# Patient Record
Sex: FEMALE | Race: WHITE | NOT HISPANIC OR LATINO | Employment: OTHER | URBAN - METROPOLITAN AREA
[De-identification: names, ages, dates, MRNs, and addresses within clinical notes are randomized per-mention and may not be internally consistent; named-entity substitution may affect disease eponyms.]

---

## 2023-03-10 LAB
ALANINE AMINOTRANSFERASE (SGPT) (U/L) IN SER/PLAS: 16 U/L (ref 7–45)
ALBUMIN (G/DL) IN SER/PLAS: 4.4 G/DL (ref 3.4–5)
ALKALINE PHOSPHATASE (U/L) IN SER/PLAS: 64 U/L (ref 33–136)
ANION GAP IN SER/PLAS: 9 MMOL/L (ref 10–20)
ASPARTATE AMINOTRANSFERASE (SGOT) (U/L) IN SER/PLAS: 20 U/L (ref 9–39)
BILIRUBIN TOTAL (MG/DL) IN SER/PLAS: 0.5 MG/DL (ref 0–1.2)
CALCIUM (MG/DL) IN SER/PLAS: 9.6 MG/DL (ref 8.6–10.3)
CARBON DIOXIDE, TOTAL (MMOL/L) IN SER/PLAS: 31 MMOL/L (ref 21–32)
CHLORIDE (MMOL/L) IN SER/PLAS: 103 MMOL/L (ref 98–107)
CHOLESTEROL (MG/DL) IN SER/PLAS: 113 MG/DL (ref 0–199)
CHOLESTEROL IN HDL (MG/DL) IN SER/PLAS: 34.8 MG/DL
CHOLESTEROL/HDL RATIO: 3.2
COBALAMIN (VITAMIN B12) (PG/ML) IN SER/PLAS: 485 PG/ML (ref 211–911)
CREATININE (MG/DL) IN SER/PLAS: 1.04 MG/DL (ref 0.5–1.05)
ESTIMATED AVERAGE GLUCOSE FOR HBA1C: 100 MG/DL
GFR FEMALE: 58 ML/MIN/1.73M2
GLUCOSE (MG/DL) IN SER/PLAS: 90 MG/DL (ref 74–99)
HEMOGLOBIN A1C/HEMOGLOBIN TOTAL IN BLOOD: 5.1 %
LDL: 53 MG/DL (ref 0–99)
POTASSIUM (MMOL/L) IN SER/PLAS: 3.6 MMOL/L (ref 3.5–5.3)
PROTEIN TOTAL: 6.8 G/DL (ref 6.4–8.2)
SODIUM (MMOL/L) IN SER/PLAS: 139 MMOL/L (ref 136–145)
THYROTROPIN (MIU/L) IN SER/PLAS BY DETECTION LIMIT <= 0.05 MIU/L: 2.95 MIU/L (ref 0.44–3.98)
TRIGLYCERIDE (MG/DL) IN SER/PLAS: 128 MG/DL (ref 0–149)
UREA NITROGEN (MG/DL) IN SER/PLAS: 12 MG/DL (ref 6–23)
VLDL: 26 MG/DL (ref 0–40)

## 2023-09-19 LAB
ALBUMIN (G/DL) IN SER/PLAS: 4.5 G/DL (ref 3.4–5)
ANION GAP IN SER/PLAS: 12 MMOL/L (ref 10–20)
APPEARANCE, URINE: ABNORMAL
BASOPHILS (10*3/UL) IN BLOOD BY AUTOMATED COUNT: 0.03 X10E9/L (ref 0–0.1)
BASOPHILS/100 LEUKOCYTES IN BLOOD BY AUTOMATED COUNT: 0.5 % (ref 0–2)
BILIRUBIN, URINE: NEGATIVE
BLOOD, URINE: NEGATIVE
C REACTIVE PROTEIN (MG/L) IN SER/PLAS: 0.19 MG/DL
CALCIUM (MG/DL) IN SER/PLAS: 9.9 MG/DL (ref 8.6–10.3)
CARBON DIOXIDE, TOTAL (MMOL/L) IN SER/PLAS: 29 MMOL/L (ref 21–32)
CHLORIDE (MMOL/L) IN SER/PLAS: 103 MMOL/L (ref 98–107)
COLOR, URINE: ABNORMAL
CREATININE (MG/DL) IN SER/PLAS: 0.86 MG/DL (ref 0.5–1.05)
EOSINOPHILS (10*3/UL) IN BLOOD BY AUTOMATED COUNT: 0.27 X10E9/L (ref 0–0.7)
EOSINOPHILS/100 LEUKOCYTES IN BLOOD BY AUTOMATED COUNT: 4.3 % (ref 0–6)
ERYTHROCYTE DISTRIBUTION WIDTH (RATIO) BY AUTOMATED COUNT: 12.7 % (ref 11.5–14.5)
ERYTHROCYTE MEAN CORPUSCULAR HEMOGLOBIN CONCENTRATION (G/DL) BY AUTOMATED: 34.3 G/DL (ref 32–36)
ERYTHROCYTE MEAN CORPUSCULAR VOLUME (FL) BY AUTOMATED COUNT: 90 FL (ref 80–100)
ERYTHROCYTES (10*6/UL) IN BLOOD BY AUTOMATED COUNT: 4.77 X10E12/L (ref 4–5.2)
GFR FEMALE: 72 ML/MIN/1.73M2
GLUCOSE (MG/DL) IN SER/PLAS: 87 MG/DL (ref 74–99)
GLUCOSE, URINE: NEGATIVE MG/DL
HEMATOCRIT (%) IN BLOOD BY AUTOMATED COUNT: 42.8 % (ref 36–46)
HEMOGLOBIN (G/DL) IN BLOOD: 14.7 G/DL (ref 12–16)
HEPATITIS B VIRUS SURFACE AB (MIU/ML) IN SERUM: 990.3 MIU/ML
IMMATURE GRANULOCYTES/100 LEUKOCYTES IN BLOOD BY AUTOMATED COUNT: 0.3 % (ref 0–0.9)
KETONES, URINE: NEGATIVE MG/DL
LEUKOCYTE ESTERASE, URINE: ABNORMAL
LEUKOCYTES (10*3/UL) IN BLOOD BY AUTOMATED COUNT: 6.3 X10E9/L (ref 4.4–11.3)
LYMPHOCYTES (10*3/UL) IN BLOOD BY AUTOMATED COUNT: 2.1 X10E9/L (ref 1.2–4.8)
LYMPHOCYTES/100 LEUKOCYTES IN BLOOD BY AUTOMATED COUNT: 33.5 % (ref 13–44)
MONOCYTES (10*3/UL) IN BLOOD BY AUTOMATED COUNT: 0.35 X10E9/L (ref 0.1–1)
MONOCYTES/100 LEUKOCYTES IN BLOOD BY AUTOMATED COUNT: 5.6 % (ref 2–10)
NEUTROPHILS (10*3/UL) IN BLOOD BY AUTOMATED COUNT: 3.49 X10E9/L (ref 1.2–7.7)
NEUTROPHILS/100 LEUKOCYTES IN BLOOD BY AUTOMATED COUNT: 55.8 % (ref 40–80)
NITRITE, URINE: NEGATIVE
PH, URINE: 7 (ref 5–8)
PHOSPHATE (MG/DL) IN SER/PLAS: 3.8 MG/DL (ref 2.5–4.9)
PLATELETS (10*3/UL) IN BLOOD AUTOMATED COUNT: 231 X10E9/L (ref 150–450)
POTASSIUM (MMOL/L) IN SER/PLAS: 3.9 MMOL/L (ref 3.5–5.3)
PROTEIN, URINE: NEGATIVE MG/DL
RBC, URINE: 7 /HPF (ref 0–5)
SEDIMENTATION RATE, ERYTHROCYTE: 2 MM/H (ref 0–30)
SODIUM (MMOL/L) IN SER/PLAS: 140 MMOL/L (ref 136–145)
SPECIFIC GRAVITY, URINE: 1.01 (ref 1–1.03)
SQUAMOUS EPITHELIAL CELLS, URINE: <1 /HPF
TRANSITIONAL EPITHELIAL CELLS, URINE: <1 /HPF
UREA NITROGEN (MG/DL) IN SER/PLAS: 17 MG/DL (ref 6–23)
UROBILINOGEN, URINE: <2 MG/DL (ref 0–1.9)
WBC, URINE: 5 /HPF (ref 0–5)

## 2023-10-21 PROBLEM — D22.60 MELANOCYTIC NEVI OF UNSPECIFIED UPPER LIMB, INCLUDING SHOULDER: Status: ACTIVE | Noted: 2023-07-12

## 2023-10-21 PROBLEM — D18.01 HEMANGIOMA OF SKIN AND SUBCUTANEOUS TISSUE: Status: ACTIVE | Noted: 2023-07-12

## 2023-10-21 PROBLEM — R35.1 NOCTURIA: Status: ACTIVE | Noted: 2023-10-21

## 2023-10-21 PROBLEM — L81.4 OTHER MELANIN HYPERPIGMENTATION: Status: ACTIVE | Noted: 2023-07-12

## 2023-10-21 PROBLEM — L71.9 ROSACEA, UNSPECIFIED: Status: ACTIVE | Noted: 2023-07-12

## 2023-10-21 PROBLEM — D22.5 MELANOCYTIC NEVI OF TRUNK: Status: ACTIVE | Noted: 2023-07-12

## 2023-10-21 PROBLEM — L90.5 SCAR CONDITION AND FIBROSIS OF SKIN: Status: ACTIVE | Noted: 2023-07-12

## 2023-10-21 PROBLEM — E78.5 HYPERLIPIDEMIA: Status: ACTIVE | Noted: 2023-10-21

## 2023-10-21 PROBLEM — H35.371 EPIRETINAL MEMBRANE (ERM) OF RIGHT EYE: Status: ACTIVE | Noted: 2023-10-21

## 2023-10-21 PROBLEM — L71.8 OTHER ROSACEA: Status: ACTIVE | Noted: 2023-07-12

## 2023-10-21 PROBLEM — Z96.1 PSEUDOPHAKIA OF RIGHT EYE: Status: ACTIVE | Noted: 2023-10-21

## 2023-10-21 PROBLEM — N28.1 SIMPLE RENAL CYST: Status: ACTIVE | Noted: 2023-10-21

## 2023-10-21 PROBLEM — Z86.39 HISTORY OF OBESITY: Status: ACTIVE | Noted: 2023-10-21

## 2023-10-21 PROBLEM — L73.8 OTHER SPECIFIED FOLLICULAR DISORDERS: Status: ACTIVE | Noted: 2023-07-12

## 2023-10-21 PROBLEM — D48.5 NEOPLASM OF UNCERTAIN BEHAVIOR OF SKIN: Status: ACTIVE | Noted: 2023-07-12

## 2023-10-21 PROBLEM — D22.39 MELANOCYTIC NEVI OF OTHER PARTS OF FACE: Status: ACTIVE | Noted: 2023-07-12

## 2023-10-21 PROBLEM — S49.90XA SHOULDER INJURY, INITIAL ENCOUNTER: Status: ACTIVE | Noted: 2023-10-21

## 2023-10-21 PROBLEM — Z85.828 PERSONAL HISTORY OF OTHER MALIGNANT NEOPLASM OF SKIN: Status: ACTIVE | Noted: 2023-07-12

## 2023-10-21 PROBLEM — H26.491 PCO (POSTERIOR CAPSULAR OPACIFICATION), RIGHT: Status: ACTIVE | Noted: 2023-10-21

## 2023-10-21 PROBLEM — R63.5 WEIGHT GAIN: Status: ACTIVE | Noted: 2023-10-21

## 2023-10-21 PROBLEM — Z98.41 HISTORY OF RIGHT CATARACT EXTRACTION: Status: ACTIVE | Noted: 2023-10-21

## 2023-10-21 PROBLEM — S42.253A: Status: ACTIVE | Noted: 2023-10-21

## 2023-10-21 PROBLEM — J45.909 ASTHMA (HHS-HCC): Status: ACTIVE | Noted: 2023-10-21

## 2023-10-21 PROBLEM — L57.9 SKIN CHANGES DUE TO CHRONIC EXPOSURE TO NONIONIZING RADIATION, UNSPECIFIED: Status: ACTIVE | Noted: 2023-07-12

## 2023-10-21 PROBLEM — M65.332 TRIGGER MIDDLE FINGER OF LEFT HAND: Status: ACTIVE | Noted: 2023-10-21

## 2023-10-21 PROBLEM — H25.812 COMBINED FORMS OF AGE-RELATED CATARACT OF LEFT EYE: Status: ACTIVE | Noted: 2023-10-21

## 2023-10-21 PROBLEM — H52.00 HYPEROPIA: Status: ACTIVE | Noted: 2023-10-21

## 2023-10-21 PROBLEM — L57.0 ACTINIC KERATOSIS: Status: ACTIVE | Noted: 2023-07-12

## 2023-10-21 PROBLEM — D18.03 LIVER HEMANGIOMA: Status: ACTIVE | Noted: 2023-10-21

## 2023-10-21 PROBLEM — D22.70 MELANOCYTIC NEVI OF UNSPECIFIED LOWER LIMB, INCLUDING HIP: Status: ACTIVE | Noted: 2023-07-12

## 2023-10-21 PROBLEM — L71.9 BLEPHARITIS WITH ROSACEA: Status: ACTIVE | Noted: 2023-10-21

## 2023-10-21 PROBLEM — M25.569 KNEE PAIN: Status: ACTIVE | Noted: 2023-10-21

## 2023-10-21 PROBLEM — L82.1 OTHER SEBORRHEIC KERATOSIS: Status: ACTIVE | Noted: 2023-07-12

## 2023-10-21 PROBLEM — H04.129 DRY EYE SYNDROME: Status: ACTIVE | Noted: 2023-10-21

## 2023-10-21 PROBLEM — I10 ESSENTIAL HYPERTENSION: Status: ACTIVE | Noted: 2023-10-21

## 2023-10-21 PROBLEM — E83.52 HYPERCALCEMIA: Status: ACTIVE | Noted: 2023-10-21

## 2023-10-21 PROBLEM — L82.0 INFLAMED SEBORRHEIC KERATOSIS: Status: ACTIVE | Noted: 2023-07-12

## 2023-10-21 PROBLEM — H01.009 BLEPHARITIS WITH ROSACEA: Status: ACTIVE | Noted: 2023-10-21

## 2023-10-21 RX ORDER — PERPHENAZINE/AMITRIPTYLINE HCL 4 MG-25 MG
1 TABLET ORAL DAILY
COMMUNITY

## 2023-10-21 RX ORDER — VITAMIN B COMPLEX
1 CAPSULE ORAL DAILY
COMMUNITY

## 2023-10-21 RX ORDER — VIT C/E/ZN/COPPR/LUTEIN/ZEAXAN 250MG-90MG
1 CAPSULE ORAL DAILY
COMMUNITY
End: 2024-03-11 | Stop reason: WASHOUT

## 2023-10-21 RX ORDER — MELATON/THEAN/VAL/LEM/CHAM/LAV 10MG-200MG
1 TABLET,IMMED, EXTENDED RELEASE, BIPHASIC ORAL DAILY
COMMUNITY
End: 2024-03-21 | Stop reason: SDUPTHER

## 2023-10-21 RX ORDER — BUPROPION HYDROCHLORIDE 150 MG/1
1 TABLET ORAL DAILY
COMMUNITY
Start: 2015-02-05

## 2023-10-21 RX ORDER — MULTIVITAMIN
1 TABLET ORAL DAILY
COMMUNITY
Start: 2016-04-13

## 2023-10-21 RX ORDER — ALBUTEROL SULFATE 90 UG/1
2 AEROSOL, METERED RESPIRATORY (INHALATION) EVERY 4 HOURS PRN
COMMUNITY
Start: 2020-06-22

## 2023-10-21 RX ORDER — HYDROCHLOROTHIAZIDE 25 MG/1
1 TABLET ORAL DAILY
COMMUNITY
Start: 2015-03-05

## 2023-10-21 RX ORDER — DICLOFENAC SODIUM 30 MG/G
GEL TOPICAL
COMMUNITY

## 2023-10-21 RX ORDER — CALCIUM CARBONATE 600 MG
1 TABLET ORAL DAILY
COMMUNITY

## 2023-10-21 RX ORDER — CHOLECALCIFEROL (VITAMIN D3) 50 MCG
1 TABLET ORAL DAILY
COMMUNITY

## 2023-10-21 RX ORDER — ATORVASTATIN CALCIUM 10 MG/1
1 TABLET, FILM COATED ORAL DAILY
COMMUNITY

## 2023-10-24 ENCOUNTER — TELEMEDICINE (OUTPATIENT)
Dept: PRIMARY CARE | Facility: CLINIC | Age: 68
End: 2023-10-24
Payer: MEDICARE

## 2023-10-24 DIAGNOSIS — R82.998 LEUKOCYTES IN URINE: Primary | ICD-10-CM

## 2023-10-24 PROCEDURE — 99213 OFFICE O/P EST LOW 20 MIN: CPT | Performed by: INTERNAL MEDICINE

## 2023-10-24 RX ORDER — ESTRADIOL 0.1 MG/G
CREAM VAGINAL
Qty: 42.5 G | Refills: 5 | Status: SHIPPED | OUTPATIENT
Start: 2023-10-24 | End: 2023-12-06 | Stop reason: SDUPTHER

## 2023-10-24 NOTE — PATIENT INSTRUCTIONS
Patient Discussion/Summary     1. Good to see you  2. For the RBC and WBC in the urine lets start with estradiol cream in the vagina twice a week Monday and Thursday a pea sized amount.    3. Glad that the left sided chest and back fullness and focal tenderness are better.  Still think was more consistent with muscle pain than anything.   4. The rest of your blood work looked great  5. Your pressure is good.  Thanks for checking.    6. Annual exam in March  7. Let me know about the National weight Control Registry next time  8.  Thanks for getting your COVID vaccine one week ago, Flu, RSV and TdaP

## 2023-10-24 NOTE — PROGRESS NOTES
Subjective   Patient ID:   [id number]   [id number]   Codie Adhikari is a 68 y.o. female who presents for No chief complaint on file..  HPI  Chest and back pain has improved.  It is still a twinge every now and then.    Blood pressure review.  Yesterday 129/82 and this /80.    ROS were reviewed and are negative with the exception of what is noted in HPI    There were no vitals taken for this visit.  Objective   Physical Exam    Speech fluent, no dyspnea    Assessment/Plan   Problem List Items Addressed This Visit    None          Provider Impressions     #Back pain - Unclear exactly how to explain the left sided chest and back fullness and focal tenderness but has improved.  Still suspect more consistent with muscle pain than anything.   #. Hypertension - pressure better.    #. Renal Cyst - left cyst unchanged from 2005 CT, 2018 CT, 2018 MRI with unchanged 5.7 cm exophytic simple cyst. multiple ultrasounds, last one 2011 was felt to be stable and that no further imaging was indicated.  #. Microscopic hematuria, leuocyturia - she had negative evaluation for hematuria remote past.  Remote history of proteinuria last saw nephrology 2006.  Would like her to try a bit of estradiol cream vaginally and see if that help with the white cells.  Also to check culture.   #. Weight - She has lost 34 pounds over past 18 months and she is really happy with her progress and lifestyle changes. Impressed and reinforced her good habits. Uses the [employer] nando.   #. GERD - past response to omeprazole and now basically resolved with weight loss unless she eats dairy. CT negative except hemangioma liver, stable 5.7 cm renal cyst left. To get EGD with next colonoscopy and ordered.   #. Asthma- stable and doing well, immunizations uptodate  #. Lipids - on atorvastatin, future consider increase to 20 mg.   #. Basal Cell CA- she watches, derm followup   #. Anxiety, depression - bupropion has been helpful and she would like to continue for  "now. Tried to get off and felt was angry all of the time so wants to continue.   #. Neuropathy vs. Raynauds-- less issue on low carbs  #. Fatty liver, hemangioma - MRI 2018 clarification. She has lost 20 pounds and suspect this will improve. Ultrasound ordered to review.   #. Epicondylitis - Mj saw and felt \"golfers elbow\"  #. HM-   full physical exam [facility] 2023  bone densitometry normal 1/11  mammo 03/23  colonoscopy-2014 with adenomatous polyp, repeat 2019 with 10 mm sessile serrated adenoma repeat 2023 with tubular adenoma and EGD negative. Repeat in 2028.    PAP 2014 with HPV negative. done at this point.   Immunizations: current with flu shot, COVID, RSV, HZV and TdaP, prevnar, P23 and shingrix     Glenny Liu MD  "

## 2023-12-06 DIAGNOSIS — R82.998 LEUKOCYTES IN URINE: ICD-10-CM

## 2023-12-06 RX ORDER — ESTRADIOL 0.1 MG/G
CREAM VAGINAL
Qty: 42.5 G | Refills: 5 | Status: SHIPPED | OUTPATIENT
Start: 2023-12-06 | End: 2024-12-05

## 2024-01-17 ENCOUNTER — OFFICE VISIT (OUTPATIENT)
Dept: DERMATOLOGY | Facility: CLINIC | Age: 69
End: 2024-01-17
Payer: MEDICARE

## 2024-01-17 DIAGNOSIS — L82.1 SEBORRHEIC KERATOSIS: ICD-10-CM

## 2024-01-17 DIAGNOSIS — D18.01 HEMANGIOMA OF SKIN: ICD-10-CM

## 2024-01-17 DIAGNOSIS — D22.9 MULTIPLE BENIGN NEVI: ICD-10-CM

## 2024-01-17 DIAGNOSIS — L81.4 LENTIGO: ICD-10-CM

## 2024-01-17 DIAGNOSIS — Z85.828 PERSONAL HISTORY OF SKIN CANCER: Primary | ICD-10-CM

## 2024-01-17 PROCEDURE — 1126F AMNT PAIN NOTED NONE PRSNT: CPT | Performed by: STUDENT IN AN ORGANIZED HEALTH CARE EDUCATION/TRAINING PROGRAM

## 2024-01-17 PROCEDURE — 99213 OFFICE O/P EST LOW 20 MIN: CPT | Performed by: STUDENT IN AN ORGANIZED HEALTH CARE EDUCATION/TRAINING PROGRAM

## 2024-01-17 PROCEDURE — 1159F MED LIST DOCD IN RCRD: CPT | Performed by: STUDENT IN AN ORGANIZED HEALTH CARE EDUCATION/TRAINING PROGRAM

## 2024-01-17 NOTE — PROGRESS NOTES
"Subjective   Codie Adhikari is a 68 y.o. female who presents for the following: Skin Check (LV: 7/12/23: FBSE. No concerning lesions today.).    Skin Cancer History  Unknown type of skin cancer - Right proximal FA, lesion \"burned off\" several years ago  BCC - left cheek s/p Mohs 2011  AK's    Family History of Skin Cancer  Father-NMSC    The following portions of the chart were reviewed this encounter and updated as appropriate:         Review of Systems: No other skin or systemic complaints.    Objective   Well appearing patient in no apparent distress; mood and affect are within normal limits.    A full examination was performed including scalp, head, eyes, ears, nose, lips, neck, chest, axillae, abdomen, back, buttocks, bilateral upper extremities, bilateral lower extremities, hands, feet, fingers, toes, fingernails, and toenails. All findings within normal limits unless otherwise noted below.    Well healed scar(s) at site(s) of prior treatment    Scattered cherry-red papule(s).    Scattered tan macules in sun-exposed areas.    Stuck on verrucous, tan-brown papules and plaques.      Scattered, uniform and benign-appearing, regular brown melanocytic papules and macules.      Assessment/Plan   Personal history of skin cancer    No evidence of recurrence at site(s) of prior treatment  Continue photoprotection with sun-protective clothing and sunscreen SPF 30+ daily  Continue routine self-skin examination  Continue to follow up every 6-12 months for routine FBSE or earlier prn for new/changing/concerning lesions       Hemangioma of skin    Reassured of benign nature of lesions    Lentigo    Reassured of benign nature of lesions    Seborrheic keratosis    Reassured of benign nature of lesions    Multiple benign nevi    Reassured of benign nature of lesions          Scribe Attestation  By signing my name below, IAby LPN , Alicia   attest that this documentation has been prepared under the direction and " in the presence of Christiano Alvarez MD.

## 2024-03-04 ENCOUNTER — LAB (OUTPATIENT)
Dept: LAB | Facility: LAB | Age: 69
End: 2024-03-04
Payer: MEDICARE

## 2024-03-04 DIAGNOSIS — R82.998 LEUKOCYTES IN URINE: ICD-10-CM

## 2024-03-04 LAB
APPEARANCE UR: CLEAR
BACTERIA #/AREA URNS AUTO: ABNORMAL /HPF
BILIRUB UR STRIP.AUTO-MCNC: NEGATIVE MG/DL
COLOR UR: YELLOW
GLUCOSE UR STRIP.AUTO-MCNC: NEGATIVE MG/DL
KETONES UR STRIP.AUTO-MCNC: NEGATIVE MG/DL
LEUKOCYTE ESTERASE UR QL STRIP.AUTO: ABNORMAL
MUCOUS THREADS #/AREA URNS AUTO: ABNORMAL /LPF
NITRITE UR QL STRIP.AUTO: NEGATIVE
PH UR STRIP.AUTO: 6 [PH]
PROT UR STRIP.AUTO-MCNC: NEGATIVE MG/DL
RBC # UR STRIP.AUTO: ABNORMAL /UL
RBC #/AREA URNS AUTO: ABNORMAL /HPF
SP GR UR STRIP.AUTO: 1.02
SQUAMOUS #/AREA URNS AUTO: ABNORMAL /HPF
UROBILINOGEN UR STRIP.AUTO-MCNC: 2 MG/DL
WBC #/AREA URNS AUTO: ABNORMAL /HPF

## 2024-03-04 PROCEDURE — 87086 URINE CULTURE/COLONY COUNT: CPT

## 2024-03-04 PROCEDURE — 81001 URINALYSIS AUTO W/SCOPE: CPT

## 2024-03-05 LAB — BACTERIA UR CULT: NORMAL

## 2024-03-06 DIAGNOSIS — R82.998 URINE LEUKOCYTES: ICD-10-CM

## 2024-03-06 DIAGNOSIS — R39.9 LOWER URINARY TRACT SYMPTOMS: Primary | ICD-10-CM

## 2024-03-06 DIAGNOSIS — R31.9 HEMATURIA, UNSPECIFIED TYPE: ICD-10-CM

## 2024-03-06 RX ORDER — NITROFURANTOIN 25; 75 MG/1; MG/1
100 CAPSULE ORAL 2 TIMES DAILY
Qty: 14 CAPSULE | Refills: 0 | Status: SHIPPED | OUTPATIENT
Start: 2024-03-06 | End: 2024-03-21 | Stop reason: ALTCHOICE

## 2024-03-06 NOTE — PROGRESS NOTES
68 year old female with urinary symptoms, WBC and RBC in urine but no growth.  Will order ultrasound and refer to urology for their assistance with urodynamics and other recommendations.  Round of nitrofurantoin as well.

## 2024-03-08 ENCOUNTER — HOSPITAL ENCOUNTER (OUTPATIENT)
Dept: RADIOLOGY | Facility: CLINIC | Age: 69
Discharge: HOME | End: 2024-03-08
Payer: MEDICARE

## 2024-03-08 DIAGNOSIS — R82.998 URINE LEUKOCYTES: ICD-10-CM

## 2024-03-08 DIAGNOSIS — R39.9 LOWER URINARY TRACT SYMPTOMS: ICD-10-CM

## 2024-03-08 DIAGNOSIS — R31.9 HEMATURIA, UNSPECIFIED TYPE: ICD-10-CM

## 2024-03-08 PROCEDURE — 76770 US EXAM ABDO BACK WALL COMP: CPT

## 2024-03-08 PROCEDURE — 76770 US EXAM ABDO BACK WALL COMP: CPT | Performed by: STUDENT IN AN ORGANIZED HEALTH CARE EDUCATION/TRAINING PROGRAM

## 2024-03-10 NOTE — PROGRESS NOTES
[facility]  Outpatient Clinic Note    Patient: Codie Adhikari  Age/Sex: 69 y.o., female  MRN: [id number]  Referred by: Dr. Glenny Liu   Virtual Visit: An interactive audio and video telecommunication system which permits real time communications between the patient (at the originating site) and provider (at the distant site) was utilized to provide this telehealth service. Verbal consent was requested and obtained from Codie Adhikari on this date 2024 for a telehealth visit.     Chief Complaint:  microhematuria          History of Present Illness  This is a 69 y.o. female,  who presents to the office as a new patient for micro hematuria, urinary urgency and frequency. The patient stated the microhematuria has bene going on since . The urinary urgency and frequency is a newer onset of a few months ago. She stated her PCP recently gave her Macrobid causing a slight relief to her urgency and frequency. Microscopic hematuria noted on microscopic urine lab RBC 11-20, and urine culture negative on 3/4/2024. Renal ultrasound completed on 3/8/2024 showed No renal calculi or hydronephrosis. Left upper pole simple cysts, the larger 6.9 cm. 1.3% postvoid urinary bladder residual. The patient has started on vaginal estrogen. She admits to ADAM that is not bothersome yet.The patient denies dysuria, gross hematuria, flank pain, abdominal pain, nausea, vomiting, fever or chills. Denies vaginal bulging, UUI, or constipation.  She reports her bowel movements are normal and daily, she drinks fiber in her coffee. She is a vegetarian. The patient is sexually active and denies pain with intercourse, she does complain of vaginal dryness.  Microscopic hematuria, she had negative evaluation for hematuria remote past. Remote history of proteinuria last saw nephrology . PCP prescribed estradiol cream vaginally and to see if that would help with the white cells. Renal Cyst - left cyst unchanged from  CT,  2018 CT, 2018 MRI with unchanged 5.7 cm exophytic simple cyst. multiple ultrasounds, last one 2011 was felt to be stable and that no further imaging was indicated.   Patient had two vaginal births. She denies any other pelvic surgeries or PMB. She denies a breast cancer history. Denies ever using tobacco.         Gyn History:  - Menopausal: No           Postmenopausal bleeding: No  - Hysterectomy: No  - Pap up to date: Yes - 2014 HPV negative   History of abnormal pap: No  - Sexually active:  Yes  Dyspareunia: No   Other issues: vaginal dryness  - Number of prior vaginal deliveries: 2  - Number of prior c-sections: 0    - Mammogram up to date: Yes - 03/2023  - Colonoscopy up to date: Yes - colonoscopy-2014 with adenomatous polyp, repeat 2019 with 10 mm sessile serrated adenoma repeat 2023 with tubular adenoma and EGD negative. 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    Breast anomaly    Dyspnea, unspecified 02/05/2015    Dyspnea    Elevated blood-pressure reading, without diagnosis of hypertension 05/03/2017    Borderline hypertension    Encounter for immunization 08/12/2021    Encounter for immunization    Encounter for screening for malignant neoplasm of colon 10/03/2019    Screen for colon cancer    Gastro-esophageal reflux disease without esophagitis 08/12/2021    GERD (gastroesophageal reflux disease)    Long term (current) use of antibiotics 07/27/2016    Prophylactic antibiotic    Low back pain, unspecified 05/03/2017    Low back pain    Myositis, unspecified 05/03/2017    Myofasciitis    Nondisplaced fracture of greater tuberosity of left humerus, initial encounter for closed fracture 02/01/2021    Closed nondisplaced fracture of greater  tuberosity of left humerus, initial encounter    Other conditions influencing health status 02/20/2019    Intentional weight loss    Other general symptoms and signs 05/03/2017    Heat intolerance    Other specified anxiety disorders 04/13/2016    Depression with anxiety    Pain in right shoulder 08/29/2019    Shoulder pain, right    Pain in unspecified joint 05/03/2017    Joint pain    Palpitations 05/03/2017    Palpitations    Personal history of other diseases of the musculoskeletal system and connective tissue 07/18/2018    History of back pain    Personal history of other medical treatment 07/18/2018    History of screening mammography    Snoring 05/03/2017    Snoring    Strain of unspecified muscle, fascia and tendon at shoulder and upper arm level, left arm, initial encounter 08/29/2019    Strain of elbow, left    Unspecified cataract 04/27/2016    Cataract of right eye    Unspecified cataract 04/27/2016    Cataract of left eye    Unspecified cataract 04/27/2016    Cataract of right eye    Unspecified cataract 04/27/2016    Cataract of left eye    Vitamin D deficiency, unspecified 08/03/2017    Vitamin D deficiency     Past Surgical History:   Procedure Laterality Date    OTHER SURGICAL HISTORY  08/17/2022    Cataract surgery    TONSILLECTOMY  04/27/2016    Tonsillectomy    TUBAL LIGATION  04/27/2016    Tubal Ligation       Family History  Family History   Problem Relation Name Age of Onset    Hypertension Mother      Other (Diabetes mellitus) Father      Glaucoma Father      Hypertension Father         Social History  She has no history on file for tobacco use, alcohol use, and drug use.    Allergies  Latex and Tetracyclines    Medications:  Current Outpatient Medications on File Prior to Visit   Medication Sig Dispense Refill    albuterol 90 mcg/actuation inhaler Inhale 2 puffs every 4 hours if needed.      atorvastatin (Lipitor) 10 mg tablet Take 1 tablet (10 mg) by mouth once daily.      b complex  vitamins capsule Take 1 capsule by mouth once daily.      buPROPion XL (Wellbutrin XL) 150 mg 24 hr tablet Take 1 tablet (150 mg) by mouth once daily.      calcium carbonate 600 mg calcium (1,500 mg) tablet Take 1 tablet (600 mg) by mouth once daily.      cholecalciferol (Vitamin D-3) 25 MCG (1000 UT) capsule Take 1 capsule (25 mcg) by mouth once daily.      cholecalciferol (Vitamin D-3) 50 MCG (2000 UT) tablet Take 1 tablet (2,000 Units) by mouth once daily.      diclofenac sodium 3 % gel Apply sparingly to the affected areas twice daily      estradiol (Estrace) 0.01 % (0.1 mg/gram) vaginal cream Apply pea sized amount to vagina nightly for 1 week then every Monday and Thursday 42.5 g 5    folic acid/vit B complex and C (B complex-vitamin C-folic acid) 400 mcg tablet extended release Take 1 tablet by mouth once daily.      glucosam/chond-msm1/C/nehemiah/bor (GLUCOSAMINE-CHOND-MSM COMPLEX ORAL) Take 1 tablet by mouth once daily. As directed.      hydroCHLOROthiazide (HYDRODiuril) 25 mg tablet Take 1 tablet (25 mg) by mouth once daily.      lutein 40 mg capsule Take 1 capsule by mouth once daily.      multivitamin (Daily Multi-Vitamin) tablet Take 1 tablet by mouth once daily.      nitrofurantoin, macrocrystal-monohydrate, (Macrobid) 100 mg capsule Take 1 capsule (100 mg) by mouth 2 times a day for 7 days. 14 capsule 0    NON FORMULARY Bupivacaine HCI SOSY      PROTEIN SUPPLEMENT ORAL 80% POWD; Use as directed.       No current facility-administered medications on file prior to visit.        Review of Systems   A comprehensive 10+ review of systems was negative except for: see hpi          Physical Exam                                                                                                                      General: Well developed, well nourished, alert and cooperative, appears in no acute distress  Eyes: no proptosis  Lungs: Breathing is easy, non-labored while speaking in clear and complete sentences.    Neuro: alert and oriented to person, place and time  Psych: Demonstrates good judgement and reason, without hallucinations, abnormal affect or abnormal behaviors.  Skin: no obvious lesions, no rashes    Labs  Microscopic Only, Urine  Order: [id number] - Reflex for Order [id number]  Status: Final result       Visible to patient: Yes (seen)       Dx: Leukocytes in urine    0 Result Notes        Component  Ref Range & Units 6 d ago 5 mo ago 4 yr ago   WBC, Urine  1-5, NONE /HPF 1-5 5 R 1 R   RBC, Urine  NONE, 1-2, 3-5 /HPF 11-20 Abnormal  7 Abnormal  R 1 R   Squamous Epithelial Cells, Urine  Reference range not established. /HPF 1-9 (SPARSE) <1 R    Bacteria, Urine  NONE SEEN /HPF 1+ Abnormal      Mucus, Urine  Reference range not established. /LPF 1+     Resulting Agency [facility]              Specimen Collected: 03/04/24 10:05 Last Resulted: 03/04/24 17:48           Urine Culture  Order: [id number]  Collected 3/4/2024 10:05       Status: Final result       Visible to patient: Yes (seen)       Dx: Leukocytes in urine    Specimen Information: Clean Catch/Voided; Urine   0 Result Notes  Urine Culture No significant growth           Resulting Agency: [facility]           Specimen Collected: 03/04/24 10:05 Last Resulted: 03/05/24 13:39             Imaging  US renal Complete 03/08/2024  FINDINGS:  RIGHT KIDNEY:  The right kidney measures 11.1 cm in length. The renal cortical  echogenicity and thickness are within normal limits. No  hydronephrosis. No sonographic evidence of nephrolithiasis.      LEFT KIDNEY:  The left kidney measures 12.4 cm in length. The renal cortical  echogenicity and thickness are within normal limits. 24 X 19 X 18 MM  and 6.9 X 6.5 X 6.4 CM upper pole simple cysts. Mild pelviectasis. No  hydronephrosis. No sonographic evidence of nephrolithiasis.      BLADDER:  The urinary bladder is unremarkable in appearance. Bilateral ureteral  jets visualized. Prevoid volume 307 cc, postvoid  volume 5 cc.  Postvoid residual 1.3%.      IMPRESSION:  No renal calculi or hydronephrosis.  Left upper pole simple cysts, the larger 6.9 cm.  1.3% postvoid urinary bladder residual.      Signed by: John Zapien 3/9/2024 8:32 AM  Dictation workstation:   [id number]      IMPRESSION AND PLAN:  Codie Adhikari is a 69 y.o.  who presents to the office as a new patient for micro hematuria, urinary urgency and frequency. The patient stated the microhematuria has bene going on since . The urinary urgency and frequency is a newer onset of a few months ago. She stated her PCP recently gave her Macrobid causing a slight relief to her urgency and frequency. Microscopic hematuria noted on microscopic urine lab RBC 11-20, and urine culture negative on 3/4/2024. Renal ultrasound completed on 3/8/2024 showed No renal calculi or hydronephrosis. Left upper pole simple cysts, the larger 6.9 cm. 1.3% postvoid urinary bladder residual.     Microscopic Hematuria  -According to the [employer], microscopic hematuria is defined by the presence of three or more red blood cells per high-powered field on microscopic examination of one properly collected, non-contaminated urinalysis with no evidence of infection.  -Microscopic hematuria noted on microscopic urine lab RBC 11-20, and urine culture negative on 3/4/2024.  -We will plan for a diagnostic hematuria workup including: CT Urogram and cystoscopy. Patient is aware of the risks associated with IV contrast. There is no history of renal dysfunction. Risks of cystoscopy were discussed with the patient in great detail, including risk of hematuria, UTI and discomfort.    GMS  -Continue vaginal estrogen twice weekly    Renal Cyst  -Renal ultrasound completed on 3/8/2024 showed No renal calculi or hydronephrosis. Left upper pole simple cysts, the larger 6.9 cm. 1.3% postvoid urinary bladder residual.     Cystoscopy scheduled for  with Dr. Jack Canales  questions and concerns were answered and addressed.  The patient expressed understanding and agrees with the plan.     /Reviewed and approved by ANDRESSA HURLEY on 3/11/24 at 7:16 AM.

## 2024-03-11 ENCOUNTER — TELEMEDICINE (OUTPATIENT)
Dept: UROLOGY | Facility: CLINIC | Age: 69
End: 2024-03-11
Payer: MEDICARE

## 2024-03-11 ENCOUNTER — LAB (OUTPATIENT)
Dept: LAB | Facility: LAB | Age: 69
End: 2024-03-11
Payer: MEDICARE

## 2024-03-11 DIAGNOSIS — R39.15 URINARY URGENCY: ICD-10-CM

## 2024-03-11 DIAGNOSIS — R31.29 MICROHEMATURIA: ICD-10-CM

## 2024-03-11 DIAGNOSIS — R31.29 MICROHEMATURIA: Primary | ICD-10-CM

## 2024-03-11 DIAGNOSIS — N95.8 GENITOURINARY SYNDROME OF MENOPAUSE: ICD-10-CM

## 2024-03-11 DIAGNOSIS — R35.0 URINARY FREQUENCY: ICD-10-CM

## 2024-03-11 DIAGNOSIS — N28.1 RENAL CYST: ICD-10-CM

## 2024-03-11 LAB
CREAT SERPL-MCNC: 0.84 MG/DL (ref 0.5–1.05)
EGFRCR SERPLBLD CKD-EPI 2021: 74 ML/MIN/1.73M*2

## 2024-03-11 PROCEDURE — 36415 COLL VENOUS BLD VENIPUNCTURE: CPT

## 2024-03-11 PROCEDURE — 99204 OFFICE O/P NEW MOD 45 MIN: CPT

## 2024-03-11 PROCEDURE — 87086 URINE CULTURE/COLONY COUNT: CPT

## 2024-03-11 PROCEDURE — 1159F MED LIST DOCD IN RCRD: CPT

## 2024-03-11 PROCEDURE — 1036F TOBACCO NON-USER: CPT

## 2024-03-11 PROCEDURE — 1126F AMNT PAIN NOTED NONE PRSNT: CPT

## 2024-03-11 PROCEDURE — 82565 ASSAY OF CREATININE: CPT

## 2024-03-11 NOTE — LETTER
2024     Glenny Liu MD  [address]  Department Of Medicine-General Internal  [facility] [address]    Patient: Codie Adhikari   YOB: 1955   Date of Visit: 3/11/2024       Dear Dr. Glenny Liu MD:    Thank you for referring Codie Adhikari to me for evaluation. Below are my notes for this consultation.  If you have questions, please do not hesitate to call me. I look forward to following your patient along with you.       Sincerely,     Mariely Sheikh PA-C      CC: No Recipients  ______________________________________________________________________________________      [facility]  Outpatient Clinic Note    Patient: Codie Adhikari  Age/Sex: 69 y.o., female  MRN: [id number]  Referred by: Dr. Glenny Liu   Virtual Visit: An interactive audio and video telecommunication system which permits real time communications between the patient (at the originating site) and provider (at the distant site) was utilized to provide this telehealth service. Verbal consent was requested and obtained from Codie Adhikari on this date 2024 for a telehealth visit.     Chief Complaint:  microhematuria          History of Present Illness  This is a 69 y.o. female,  who presents to the office as a new patient for micro hematuria, urinary urgency and frequency. The patient stated the microhematuria has bene going on since . The urinary urgency and frequency is a newer onset of a few months ago. She stated her PCP recently gave her Macrobid causing a slight relief to her urgency and frequency. Microscopic hematuria noted on microscopic urine lab RBC 11-20, and urine culture negative on 3/4/2024. Renal ultrasound completed on 3/8/2024 showed No renal calculi or hydronephrosis. Left upper pole simple cysts, the larger 6.9 cm. 1.3% postvoid urinary bladder residual. The patient has started on vaginal estrogen. She admits to ADAM that is not bothersome yet.The patient denies  dysuria, gross hematuria, flank pain, abdominal pain, nausea, vomiting, fever or chills. Denies vaginal bulging, UUI, or constipation.  She reports her bowel movements are normal and daily, she drinks fiber in her coffee. She is a vegetarian. The patient is sexually active and denies pain with intercourse, she does complain of vaginal dryness.  Microscopic hematuria, she had negative evaluation for hematuria remote past. Remote history of proteinuria last saw nephrology 2006. PCP prescribed estradiol cream vaginally and to see if that would help with the white cells. Renal Cyst - left cyst unchanged from 2005 CT, 2018 CT, 2018 MRI with unchanged 5.7 cm exophytic simple cyst. multiple ultrasounds, last one 2011 was felt to be stable and that no further imaging was indicated.   Patient had two vaginal births. She denies any other pelvic surgeries or PMB. She denies a breast cancer history. Denies ever using tobacco.         Gyn History:  - Menopausal: No           Postmenopausal bleeding: No  - Hysterectomy: No  - Pap up to date: Yes - 2014 HPV negative   History of abnormal pap: No  - Sexually active:  Yes  Dyspareunia: No   Other issues: vaginal dryness  - Number of prior vaginal deliveries: 2  - Number of prior c-sections: 0    - Mammogram up to date: Yes - 03/2023  - Colonoscopy up to date: Yes - colonoscopy-2014 with adenomatous polyp, repeat 2019 with 10 mm sessile serrated adenoma repeat 2023 with tubular adenoma and EGD negative. Repeat in 2028       Past Medical & Surgical History  Past Medical History:   Diagnosis Date   • Abnormal findings on diagnostic imaging of liver and biliary tract 07/02/2018    Abnormal CT scan, liver   • Abnormal weight loss 05/14/2015    Weight loss   • Congenital malformation of breast, unspecified 05/03/2017    Breast anomaly   • Dyspnea, unspecified 02/05/2015    Dyspnea   • Elevated blood-pressure reading, without diagnosis of hypertension 05/03/2017    Borderline hypertension    • Encounter for immunization 08/12/2021    Encounter for immunization   • Encounter for screening for malignant neoplasm of colon 10/03/2019    Screen for colon cancer   • Gastro-esophageal reflux disease without esophagitis 08/12/2021    GERD (gastroesophageal reflux disease)   • Long term (current) use of antibiotics 07/27/2016    Prophylactic antibiotic   • Low back pain, unspecified 05/03/2017    Low back pain   • Myositis, unspecified 05/03/2017    Myofasciitis   • Nondisplaced fracture of greater tuberosity of left humerus, initial encounter for closed fracture 02/01/2021    Closed nondisplaced fracture of greater tuberosity of left humerus, initial encounter   • Other conditions influencing health status 02/20/2019    Intentional weight loss   • Other general symptoms and signs 05/03/2017    Heat intolerance   • Other specified anxiety disorders 04/13/2016    Depression with anxiety   • Pain in right shoulder 08/29/2019    Shoulder pain, right   • Pain in unspecified joint 05/03/2017    Joint pain   • Palpitations 05/03/2017    Palpitations   • Personal history of other diseases of the musculoskeletal system and connective tissue 07/18/2018    History of back pain   • Personal history of other medical treatment 07/18/2018    History of screening mammography   • Snoring 05/03/2017    Snoring   • Strain of unspecified muscle, fascia and tendon at shoulder and upper arm level, left arm, initial encounter 08/29/2019    Strain of elbow, left   • Unspecified cataract 04/27/2016    Cataract of right eye   • Unspecified cataract 04/27/2016    Cataract of left eye   • Unspecified cataract 04/27/2016    Cataract of right eye   • Unspecified cataract 04/27/2016    Cataract of left eye   • Vitamin D deficiency, unspecified 08/03/2017    Vitamin D deficiency     Past Surgical History:   Procedure Laterality Date   • OTHER SURGICAL HISTORY  08/17/2022    Cataract surgery   • TONSILLECTOMY  04/27/2016    Tonsillectomy   •  TUBAL LIGATION  04/27/2016    Tubal Ligation       Family History  Family History   Problem Relation Name Age of Onset   • Hypertension Mother     • Other (Diabetes mellitus) Father     • Glaucoma Father     • Hypertension Father         Social History  She has no history on file for tobacco use, alcohol use, and drug use.    Allergies  Latex and Tetracyclines    Medications:  Current Outpatient Medications on File Prior to Visit   Medication Sig Dispense Refill   • albuterol 90 mcg/actuation inhaler Inhale 2 puffs every 4 hours if needed.     • atorvastatin (Lipitor) 10 mg tablet Take 1 tablet (10 mg) by mouth once daily.     • b complex vitamins capsule Take 1 capsule by mouth once daily.     • buPROPion XL (Wellbutrin XL) 150 mg 24 hr tablet Take 1 tablet (150 mg) by mouth once daily.     • calcium carbonate 600 mg calcium (1,500 mg) tablet Take 1 tablet (600 mg) by mouth once daily.     • cholecalciferol (Vitamin D-3) 25 MCG (1000 UT) capsule Take 1 capsule (25 mcg) by mouth once daily.     • cholecalciferol (Vitamin D-3) 50 MCG (2000 UT) tablet Take 1 tablet (2,000 Units) by mouth once daily.     • diclofenac sodium 3 % gel Apply sparingly to the affected areas twice daily     • estradiol (Estrace) 0.01 % (0.1 mg/gram) vaginal cream Apply pea sized amount to vagina nightly for 1 week then every Monday and Thursday 42.5 g 5   • folic acid/vit B complex and C (B complex-vitamin C-folic acid) 400 mcg tablet extended release Take 1 tablet by mouth once daily.     • glucosam/chond-msm1/C/nehemiah/bor (GLUCOSAMINE-CHOND-MSM COMPLEX ORAL) Take 1 tablet by mouth once daily. As directed.     • hydroCHLOROthiazide (HYDRODiuril) 25 mg tablet Take 1 tablet (25 mg) by mouth once daily.     • lutein 40 mg capsule Take 1 capsule by mouth once daily.     • multivitamin (Daily Multi-Vitamin) tablet Take 1 tablet by mouth once daily.     • nitrofurantoin, macrocrystal-monohydrate, (Macrobid) 100 mg capsule Take 1 capsule (100 mg) by  mouth 2 times a day for 7 days. 14 capsule 0   • NON FORMULARY Bupivacaine HCI SOSY     • PROTEIN SUPPLEMENT ORAL 80% POWD; Use as directed.       No current facility-administered medications on file prior to visit.        Review of Systems   A comprehensive 10+ review of systems was negative except for: see hpi          Physical Exam                                                                                                                      General: Well developed, well nourished, alert and cooperative, appears in no acute distress  Eyes: no proptosis  Lungs: Breathing is easy, non-labored while speaking in clear and complete sentences.   Neuro: alert and oriented to person, place and time  Psych: Demonstrates good judgement and reason, without hallucinations, abnormal affect or abnormal behaviors.  Skin: no obvious lesions, no rashes    Labs  Microscopic Only, Urine  Order: [id number] - Reflex for Order [id number]  Status: Final result       Visible to patient: Yes (seen)       Dx: Leukocytes in urine    0 Result Notes        Component  Ref Range & Units 6 d ago 5 mo ago 4 yr ago   WBC, Urine  1-5, NONE /HPF 1-5 5 R 1 R   RBC, Urine  NONE, 1-2, 3-5 /HPF 11-20 Abnormal  7 Abnormal  R 1 R   Squamous Epithelial Cells, Urine  Reference range not established. /HPF 1-9 (SPARSE) <1 R    Bacteria, Urine  NONE SEEN /HPF 1+ Abnormal      Mucus, Urine  Reference range not established. /LPF 1+     Resulting Agency [facility]              Specimen Collected: 03/04/24 10:05 Last Resulted: 03/04/24 17:48           Urine Culture  Order: [id number]  Collected 3/4/2024 10:05       Status: Final result       Visible to patient: Yes (seen)       Dx: Leukocytes in urine    Specimen Information: Clean Catch/Voided; Urine   0 Result Notes  Urine Culture No significant growth           Resulting Agency: [facility]           Specimen Collected: 03/04/24 10:05 Last Resulted: 03/05/24 13:39             Imaging  US renal  Complete 2024  FINDINGS:  RIGHT KIDNEY:  The right kidney measures 11.1 cm in length. The renal cortical  echogenicity and thickness are within normal limits. No  hydronephrosis. No sonographic evidence of nephrolithiasis.      LEFT KIDNEY:  The left kidney measures 12.4 cm in length. The renal cortical  echogenicity and thickness are within normal limits. 24 X 19 X 18 MM  and 6.9 X 6.5 X 6.4 CM upper pole simple cysts. Mild pelviectasis. No  hydronephrosis. No sonographic evidence of nephrolithiasis.      BLADDER:  The urinary bladder is unremarkable in appearance. Bilateral ureteral  jets visualized. Prevoid volume 307 cc, postvoid volume 5 cc.  Postvoid residual 1.3%.      IMPRESSION:  No renal calculi or hydronephrosis.  Left upper pole simple cysts, the larger 6.9 cm.  1.3% postvoid urinary bladder residual.      Signed by: John Zapien 3/9/2024 8:32 AM  Dictation workstation:   [id number]      IMPRESSION AND PLAN:  Codie Ahdikari is a 69 y.o.  who presents to the office as a new patient for micro hematuria, urinary urgency and frequency. The patient stated the microhematuria has bene going on since . The urinary urgency and frequency is a newer onset of a few months ago. She stated her PCP recently gave her Macrobid causing a slight relief to her urgency and frequency. Microscopic hematuria noted on microscopic urine lab RBC 11-20, and urine culture negative on 3/4/2024. Renal ultrasound completed on 3/8/2024 showed No renal calculi or hydronephrosis. Left upper pole simple cysts, the larger 6.9 cm. 1.3% postvoid urinary bladder residual.     Microscopic Hematuria  -According to the [employer], microscopic hematuria is defined by the presence of three or more red blood cells per high-powered field on microscopic examination of one properly collected, non-contaminated urinalysis with no evidence of infection.  -Microscopic hematuria noted on microscopic urine lab RBC  11-20, and urine culture negative on 3/4/2024.  -We will plan for a diagnostic hematuria workup including: CT Urogram and cystoscopy. Patient is aware of the risks associated with IV contrast. There is no history of renal dysfunction. Risks of cystoscopy were discussed with the patient in great detail, including risk of hematuria, UTI and discomfort.    GMS  -Continue vaginal estrogen twice weekly    Renal Cyst  -Renal ultrasound completed on 3/8/2024 showed No renal calculi or hydronephrosis. Left upper pole simple cysts, the larger 6.9 cm. 1.3% postvoid urinary bladder residual.     Cystoscopy scheduled for 4/26 with Dr. Santiago    All questions and concerns were answered and addressed.  The patient expressed understanding and agrees with the plan.     /Reviewed and approved by ANDRESSA HURLEY on 3/11/24 at 7:16 AM.

## 2024-03-12 LAB — BACTERIA UR CULT: NO GROWTH

## 2024-03-15 ENCOUNTER — HOSPITAL ENCOUNTER (OUTPATIENT)
Dept: RADIOLOGY | Facility: CLINIC | Age: 69
Discharge: HOME | End: 2024-03-15
Payer: MEDICARE

## 2024-03-15 DIAGNOSIS — R31.29 MICROHEMATURIA: ICD-10-CM

## 2024-03-15 PROCEDURE — 2550000001 HC RX 255 CONTRASTS

## 2024-03-15 PROCEDURE — 76377 3D RENDER W/INTRP POSTPROCES: CPT | Performed by: RADIOLOGY

## 2024-03-15 PROCEDURE — 74177 CT ABD & PELVIS W/CONTRAST: CPT | Performed by: RADIOLOGY

## 2024-03-15 PROCEDURE — 76377 3D RENDER W/INTRP POSTPROCES: CPT

## 2024-03-15 RX ADMIN — IOHEXOL 140 ML: 350 INJECTION, SOLUTION INTRAVENOUS at 11:18

## 2024-03-21 ENCOUNTER — OFFICE VISIT (OUTPATIENT)
Dept: PRIMARY CARE | Facility: CLINIC | Age: 69
End: 2024-03-21
Payer: MEDICARE

## 2024-03-21 VITALS
BODY MASS INDEX: 28.56 KG/M2 | TEMPERATURE: 98 F | DIASTOLIC BLOOD PRESSURE: 73 MMHG | SYSTOLIC BLOOD PRESSURE: 120 MMHG | HEART RATE: 71 BPM | WEIGHT: 156 LBS

## 2024-03-21 DIAGNOSIS — Z00.00 MEDICARE ANNUAL WELLNESS VISIT, SUBSEQUENT: ICD-10-CM

## 2024-03-21 DIAGNOSIS — Z12.31 ENCOUNTER FOR SCREENING MAMMOGRAM FOR MALIGNANT NEOPLASM OF BREAST: ICD-10-CM

## 2024-03-21 DIAGNOSIS — Z12.39 ENCOUNTER FOR SCREENING FOR MALIGNANT NEOPLASM OF BREAST, UNSPECIFIED SCREENING MODALITY: ICD-10-CM

## 2024-03-21 DIAGNOSIS — R06.81 WITNESSED EPISODE OF APNEA: ICD-10-CM

## 2024-03-21 DIAGNOSIS — E78.5 HYPERLIPIDEMIA, UNSPECIFIED HYPERLIPIDEMIA TYPE: ICD-10-CM

## 2024-03-21 DIAGNOSIS — Z00.00 ANNUAL PHYSICAL EXAM: Primary | ICD-10-CM

## 2024-03-21 DIAGNOSIS — J45.909 ASTHMA, UNSPECIFIED ASTHMA SEVERITY, UNSPECIFIED WHETHER COMPLICATED, UNSPECIFIED WHETHER PERSISTENT (HHS-HCC): ICD-10-CM

## 2024-03-21 DIAGNOSIS — M85.812 OTHER SPECIFIED DISORDERS OF BONE DENSITY AND STRUCTURE, LEFT SHOULDER: ICD-10-CM

## 2024-03-21 DIAGNOSIS — I10 ESSENTIAL HYPERTENSION: ICD-10-CM

## 2024-03-21 DIAGNOSIS — E83.52 HYPERCALCEMIA: ICD-10-CM

## 2024-03-21 DIAGNOSIS — M85.80 OSTEOPENIA, UNSPECIFIED LOCATION: ICD-10-CM

## 2024-03-21 PROCEDURE — 1159F MED LIST DOCD IN RCRD: CPT | Performed by: INTERNAL MEDICINE

## 2024-03-21 PROCEDURE — G0439 PPPS, SUBSEQ VISIT: HCPCS | Performed by: INTERNAL MEDICINE

## 2024-03-21 PROCEDURE — 3074F SYST BP LT 130 MM HG: CPT | Performed by: INTERNAL MEDICINE

## 2024-03-21 PROCEDURE — 1036F TOBACCO NON-USER: CPT | Performed by: INTERNAL MEDICINE

## 2024-03-21 PROCEDURE — 3078F DIAST BP <80 MM HG: CPT | Performed by: INTERNAL MEDICINE

## 2024-03-21 NOTE — PROGRESS NOTES
Subjective   Patient ID:   [id number]   [id number]   Codie Adhikari is a 69 y.o. female who presents for No chief complaint on file..  HPI    69 year old female here for annual exam.  She saw the urologist for urinary symptoms and white and red cells with a negative culture.  Her ultrasound showed cysts on the kidney  and severe lumbar DJD but otherwise unremarkable.  She is scheduled for a cystoscopy.  Her weight is back up 11 pounds from last year.  She read a book called the whole body reset which encouraged protein and fiber daily.  Her  says her apnea is bad. Her daughter has thyroid cancer.    ROS were reviewed and are negative with the exception of what is noted in HPI    There were no vitals taken for this visit.  Objective   Physical Exam  Constitutional:       General: She is not in acute distress.  HENT:      Head: Normocephalic and atraumatic.      Right Ear: Tympanic membrane normal.      Left Ear: Tympanic membrane normal.      Mouth/Throat:      Mouth: Mucous membranes are moist.   Eyes:      Extraocular Movements: Extraocular movements intact.      Pupils: Pupils are equal, round, and reactive to light.   Neck:      Comments: Thyroid mobile, without nodules or enlargement  Cardiovascular:      Rate and Rhythm: Normal rate and regular rhythm.      Heart sounds: No murmur heard.     No friction rub. No gallop.   Pulmonary:      Effort: Pulmonary effort is normal.      Breath sounds: No wheezing, rhonchi or rales.   Abdominal:      General: There is no distension.      Palpations: Abdomen is soft.      Tenderness: There is no abdominal tenderness.   Musculoskeletal:         General: No swelling.      Cervical back: Neck supple.      Comments: No edema,  creptisu right knee with flexion,  good distal pulses bilaterally   Lymphadenopathy:      Cervical: No cervical adenopathy.   Neurological:      Mental Status: She is alert.       Right knee crepitus,   Assessment/Plan   Problem List Items  "Addressed This Visit    None    Provider Impressions     #. Hypertension - excellent. She has gained some but not all of her lost weight and is great at maintaining fitness. FOr now would maintain her HCTZ. Encourage good behavior. Labs ordered.   #. Weight - wow. She has gained back 10 of her 21 pounds lost and she is aware how to improve her ifestyle changes.  Reinforced her good habits.   #. GERD - past response to omeprazole.  CT negative except hemangioma liver, stable 5.7 cm renal cyst left. EGD and colonoscopy April 2023 with tubular adenoma and duodenal lipoma and fundal polyps.    #. Asthma- stable and doing well, immunizations uptodate  #. Lipids - on atorvastatin, future consider increase to 20 mg.   #. Basal Cell CA- she watches, derm followup   #. Anxiety, depression - buproprion has been helpful and she would like to continue for now. Life stressors  persist.    #. Neuropathy vs. Raynauds-- less issue on low carbs  #. Renal Cyst, urinary symptoms with negative culture, microscopic hematuria - appreciate input from urology.  Past with left cyst unchanged from 2005 CT, 2018 CT, 2018 MRI with 5.7 cm exophytic simple cyst. multiple ultrasounds, repeat this year cyst at 6.9 cm on ultrasound.  #. Proteinuria - no longer issue. Hricik in 2006 who noted resolution of issues  #. Fatty liver, hemangioma - MRI 2018 clarification. She has lost 20 pounds and suspect this will improve. Ultrasound ordered to review.   #. Epicondylitis - Mj saw and felt \"golfers elbow\"  #. HM-   full physical exam, MArch 2024  bone densitometry normal 1/11, repeat ordered  mammo 3/23 ordered today  colonoscopy-2023 with tubular adenoma, 2014 with adenomatous polyp, repeat 2019 with 10 mm sessile serrated adenoma repeat 2022. 2023 negative EGD given chronic GERD,, next colonoscopy 2028  PAP 2014 with HPV negative. done at this point.   had HZV, TdaP due as last one 2012, prevnar, P23 and shingrix, RSV  high dose flu vaccine   A " teaspoon of miralax in her coffee every morning works great.    Reviewed her consultants and their notes  She considers health great.    We reviewed her advance care directives.      Glenny Liu MD

## 2024-03-21 NOTE — PATIENT INSTRUCTIONS
Patient Discussion/Summary     1. Great to see you today.   2. You look amazing. You have gained 10 of the 21 pounds you lost. Your blood pressure is excellent. Your heart sounds great, your lungs are clear. Your nasal turbinates are full both sides, your breasts are challenging to examine due to the glandular and cystic nature, you have some crepitus in the right knee.  3. Labs ordered  4. Colonoscopy with one polyp and repeat due 2028  5. Mammogram ordered  6. Glad that adding a bit of miralax to your coffee every day or every other day has helped to keep you regular.     7. Thanks for staying so active.   8. Thanks for working on the gerrymandering issue.  It is so important for [place].  Will check out [email or web address].  9. We reviewed the scans and ultrasound.    10. See you in a year unless you need me sooner.   11. Advance directive url:  [email or web address]

## 2024-03-25 ENCOUNTER — LAB (OUTPATIENT)
Dept: LAB | Facility: LAB | Age: 69
End: 2024-03-25
Payer: MEDICARE

## 2024-03-25 DIAGNOSIS — R39.15 URINARY URGENCY: ICD-10-CM

## 2024-03-25 DIAGNOSIS — M85.80 OSTEOPENIA, UNSPECIFIED LOCATION: ICD-10-CM

## 2024-03-25 DIAGNOSIS — I10 ESSENTIAL HYPERTENSION: ICD-10-CM

## 2024-03-25 DIAGNOSIS — E83.52 HYPERCALCEMIA: ICD-10-CM

## 2024-03-25 DIAGNOSIS — R35.0 URINARY FREQUENCY: ICD-10-CM

## 2024-03-25 DIAGNOSIS — E78.5 HYPERLIPIDEMIA, UNSPECIFIED HYPERLIPIDEMIA TYPE: ICD-10-CM

## 2024-03-25 LAB
25(OH)D3 SERPL-MCNC: 74 NG/ML (ref 30–100)
ALBUMIN SERPL BCP-MCNC: 4.1 G/DL (ref 3.4–5)
ALP SERPL-CCNC: 67 U/L (ref 33–136)
ALT SERPL W P-5'-P-CCNC: 13 U/L (ref 7–45)
ANION GAP SERPL CALC-SCNC: 11 MMOL/L (ref 10–20)
AST SERPL W P-5'-P-CCNC: 15 U/L (ref 9–39)
BILIRUB SERPL-MCNC: 0.5 MG/DL (ref 0–1.2)
BUN SERPL-MCNC: 12 MG/DL (ref 6–23)
CALCIUM SERPL-MCNC: 9.7 MG/DL (ref 8.6–10.6)
CHLORIDE SERPL-SCNC: 103 MMOL/L (ref 98–107)
CHOLEST SERPL-MCNC: 124 MG/DL (ref 0–199)
CHOLESTEROL/HDL RATIO: 3.1
CO2 SERPL-SCNC: 31 MMOL/L (ref 21–32)
CREAT SERPL-MCNC: 0.91 MG/DL (ref 0.5–1.05)
EGFRCR SERPLBLD CKD-EPI 2021: 68 ML/MIN/1.73M*2
GLUCOSE SERPL-MCNC: 93 MG/DL (ref 74–99)
HDLC SERPL-MCNC: 40.3 MG/DL
LDLC SERPL CALC-MCNC: 69 MG/DL
NON HDL CHOLESTEROL: 84 MG/DL (ref 0–149)
POTASSIUM SERPL-SCNC: 3.7 MMOL/L (ref 3.5–5.3)
PROT SERPL-MCNC: 6.4 G/DL (ref 6.4–8.2)
PTH-INTACT SERPL-MCNC: 33.2 PG/ML (ref 18.5–88)
SODIUM SERPL-SCNC: 141 MMOL/L (ref 136–145)
TRIGL SERPL-MCNC: 75 MG/DL (ref 0–149)
VLDL: 15 MG/DL (ref 0–40)

## 2024-03-25 PROCEDURE — 87086 URINE CULTURE/COLONY COUNT: CPT

## 2024-03-25 PROCEDURE — 82306 VITAMIN D 25 HYDROXY: CPT

## 2024-03-25 PROCEDURE — 36415 COLL VENOUS BLD VENIPUNCTURE: CPT

## 2024-03-25 PROCEDURE — 80053 COMPREHEN METABOLIC PANEL: CPT

## 2024-03-25 PROCEDURE — 80061 LIPID PANEL: CPT

## 2024-03-25 PROCEDURE — 83970 ASSAY OF PARATHORMONE: CPT

## 2024-03-26 LAB — BACTERIA UR CULT: NORMAL

## 2024-04-01 ENCOUNTER — OFFICE VISIT (OUTPATIENT)
Dept: OPHTHALMOLOGY | Facility: CLINIC | Age: 69
End: 2024-04-01
Payer: MEDICARE

## 2024-04-01 DIAGNOSIS — H04.123 DRY EYE SYNDROME OF BOTH EYES: ICD-10-CM

## 2024-04-01 DIAGNOSIS — Z96.1 PSEUDOPHAKIA OF BOTH EYES: ICD-10-CM

## 2024-04-01 DIAGNOSIS — H35.371 EPIRETINAL MEMBRANE (ERM) OF RIGHT EYE: Primary | ICD-10-CM

## 2024-04-01 PROCEDURE — 99214 OFFICE O/P EST MOD 30 MIN: CPT | Performed by: OPHTHALMOLOGY

## 2024-04-01 PROCEDURE — 92134 CPTRZ OPH DX IMG PST SGM RTA: CPT | Mod: BILATERAL PROCEDURE | Performed by: OPHTHALMOLOGY

## 2024-04-01 ASSESSMENT — ENCOUNTER SYMPTOMS
CONSTITUTIONAL NEGATIVE: 0
MUSCULOSKELETAL NEGATIVE: 0
ENDOCRINE NEGATIVE: 0
GASTROINTESTINAL NEGATIVE: 0
NEUROLOGICAL NEGATIVE: 0
RESPIRATORY NEGATIVE: 0
PSYCHIATRIC NEGATIVE: 0
HEMATOLOGIC/LYMPHATIC NEGATIVE: 0
CARDIOVASCULAR NEGATIVE: 0
EYES NEGATIVE: 1
ALLERGIC/IMMUNOLOGIC NEGATIVE: 0

## 2024-04-01 ASSESSMENT — CONF VISUAL FIELD
OS_INFERIOR_NASAL_RESTRICTION: 0
OS_SUPERIOR_NASAL_RESTRICTION: 0
OD_INFERIOR_NASAL_RESTRICTION: 0
OS_SUPERIOR_TEMPORAL_RESTRICTION: 0
OD_SUPERIOR_TEMPORAL_RESTRICTION: 0
OD_INFERIOR_TEMPORAL_RESTRICTION: 0
METHOD: COUNTING FINGERS
OD_NORMAL: 1
OS_NORMAL: 1
OS_INFERIOR_TEMPORAL_RESTRICTION: 0
OD_SUPERIOR_NASAL_RESTRICTION: 0

## 2024-04-01 ASSESSMENT — EXTERNAL EXAM - RIGHT EYE: OD_EXAM: NORMAL

## 2024-04-01 ASSESSMENT — REFRACTION_WEARINGRX
OS_ADD: +2.50
OS_SPHERE: PLANO
OS_AXIS: 155
OD_ADD: +2.50
OS_CYLINDER: -0.50
OD_AXIS: 030
OD_CYLINDER: -0.25
OD_SPHERE: -0.50

## 2024-04-01 ASSESSMENT — TONOMETRY
OS_IOP_MMHG: 15
IOP_METHOD: TONOPEN
OD_IOP_MMHG: 18

## 2024-04-01 ASSESSMENT — VISUAL ACUITY
OD_CC: 20/20-2
CORRECTION_TYPE: GLASSES
OS_CC: 20/20
METHOD: SNELLEN - LINEAR

## 2024-04-01 ASSESSMENT — EXTERNAL EXAM - LEFT EYE: OS_EXAM: NORMAL

## 2024-04-01 ASSESSMENT — SLIT LAMP EXAM - LIDS
COMMENTS: NORMAL
COMMENTS: NORMAL

## 2024-04-01 ASSESSMENT — CUP TO DISC RATIO
OD_RATIO: .3
OS_RATIO: .3

## 2024-04-01 NOTE — PROGRESS NOTES
Assessment/Plan   Diagnoses and all orders for this visit:  Epiretinal membrane (ERM) of right eye  -     OCT, Retina - OU - Both Eyes  Stable ERM OD  Monitor    Dry eye syndrome of both eyes  ATs    Pseudophakia of both eyes  Stable    1 year for mac Oct and DFE

## 2024-04-02 ENCOUNTER — TELEPHONE (OUTPATIENT)
Dept: ENDOCRINOLOGY | Facility: CLINIC | Age: 69
End: 2024-04-02
Payer: MEDICARE

## 2024-04-02 DIAGNOSIS — Z00.00 HEALTHCARE MAINTENANCE: ICD-10-CM

## 2024-04-02 DIAGNOSIS — Z09 ENCOUNTER FOR FOLLOW-UP EXAMINATION AFTER COMPLETED TREATMENT FOR CONDITIONS OTHER THAN MALIGNANT NEOPLASM: ICD-10-CM

## 2024-04-02 NOTE — TELEPHONE ENCOUNTER
Patient made an appt for a bone density scan but canceled it and the scheduling department can't see the referral anymore because it's in the finalized stage, they would like to know if it could be reordered.

## 2024-04-02 NOTE — PROGRESS NOTES
Patient needs update order to schedule screening. Patient had previous one schedule but was unable to make appointment. Order pending provider approval.

## 2024-04-04 ENCOUNTER — CLINICAL SUPPORT (OUTPATIENT)
Dept: SLEEP MEDICINE | Facility: HOSPITAL | Age: 69
End: 2024-04-04
Payer: MEDICARE

## 2024-04-04 DIAGNOSIS — G47.33 OBSTRUCTIVE SLEEP APNEA (ADULT) (PEDIATRIC): ICD-10-CM

## 2024-04-04 DIAGNOSIS — R06.81 WITNESSED EPISODE OF APNEA: ICD-10-CM

## 2024-04-04 PROCEDURE — 95806 SLEEP STUDY UNATT&RESP EFFT: CPT | Performed by: SPECIALIST

## 2024-04-04 NOTE — PROGRESS NOTES
Type of Study: HOME SLEEP STUDY - NOMAD     The patient received equipment and instructions for use of the [facility] Nomad HSAT 4006 device. The patient was instructed how to apply the effort belts, cannula, thermistor. It was also explained how the Nomad and oximeter components work.  The patient was asked to record their sleep for an 8-hour period.     The patient was informed of their responsibility for the device and acknowledged this by signing the HSAT device contract. The patient was asked to return the device on 4/5/2024 between the hours of 9am to the [facility].     The patient was instructed to call 911 as usual for any medical- emergencies while at home.  The patient was also given a phone number for troubleshooting when using the device in case there were additional questions.

## 2024-04-05 ENCOUNTER — HOSPITAL ENCOUNTER (OUTPATIENT)
Dept: RADIOLOGY | Facility: CLINIC | Age: 69
Discharge: HOME | End: 2024-04-05
Payer: MEDICARE

## 2024-04-05 DIAGNOSIS — M85.812 OTHER SPECIFIED DISORDERS OF BONE DENSITY AND STRUCTURE, LEFT SHOULDER: ICD-10-CM

## 2024-04-05 DIAGNOSIS — M85.80 OSTEOPENIA, UNSPECIFIED LOCATION: ICD-10-CM

## 2024-04-05 PROCEDURE — 77080 DXA BONE DENSITY AXIAL: CPT

## 2024-04-05 PROCEDURE — 77080 DXA BONE DENSITY AXIAL: CPT | Performed by: STUDENT IN AN ORGANIZED HEALTH CARE EDUCATION/TRAINING PROGRAM

## 2024-04-22 DIAGNOSIS — G47.33 OSA (OBSTRUCTIVE SLEEP APNEA): Primary | ICD-10-CM

## 2024-04-24 ENCOUNTER — APPOINTMENT (OUTPATIENT)
Dept: RADIOLOGY | Facility: CLINIC | Age: 69
End: 2024-04-24
Payer: MEDICARE

## 2024-04-25 ENCOUNTER — HOSPITAL ENCOUNTER (OUTPATIENT)
Dept: RADIOLOGY | Facility: CLINIC | Age: 69
Discharge: HOME | End: 2024-04-25
Payer: MEDICARE

## 2024-04-25 VITALS — WEIGHT: 156.09 LBS | BODY MASS INDEX: 28.72 KG/M2 | HEIGHT: 62 IN

## 2024-04-25 DIAGNOSIS — Z12.39 ENCOUNTER FOR SCREENING FOR MALIGNANT NEOPLASM OF BREAST, UNSPECIFIED SCREENING MODALITY: ICD-10-CM

## 2024-04-25 DIAGNOSIS — Z12.31 ENCOUNTER FOR SCREENING MAMMOGRAM FOR MALIGNANT NEOPLASM OF BREAST: ICD-10-CM

## 2024-04-25 PROCEDURE — 77067 SCR MAMMO BI INCL CAD: CPT | Performed by: RADIOLOGY

## 2024-04-25 PROCEDURE — 77063 BREAST TOMOSYNTHESIS BI: CPT | Performed by: RADIOLOGY

## 2024-04-25 PROCEDURE — 77067 SCR MAMMO BI INCL CAD: CPT

## 2024-04-29 ENCOUNTER — PROCEDURE VISIT (OUTPATIENT)
Dept: UROLOGY | Facility: CLINIC | Age: 69
End: 2024-04-29
Payer: MEDICARE

## 2024-04-29 ENCOUNTER — OFFICE VISIT (OUTPATIENT)
Dept: SLEEP MEDICINE | Facility: CLINIC | Age: 69
End: 2024-04-29
Payer: MEDICARE

## 2024-04-29 VITALS
WEIGHT: 156 LBS | TEMPERATURE: 97.3 F | SYSTOLIC BLOOD PRESSURE: 128 MMHG | HEART RATE: 67 BPM | DIASTOLIC BLOOD PRESSURE: 72 MMHG | BODY MASS INDEX: 28.53 KG/M2

## 2024-04-29 VITALS
DIASTOLIC BLOOD PRESSURE: 75 MMHG | HEART RATE: 59 BPM | SYSTOLIC BLOOD PRESSURE: 119 MMHG | RESPIRATION RATE: 18 BRPM | BODY MASS INDEX: 28.84 KG/M2 | WEIGHT: 156.7 LBS | HEIGHT: 62 IN | OXYGEN SATURATION: 97 %

## 2024-04-29 DIAGNOSIS — G47.33 OSA (OBSTRUCTIVE SLEEP APNEA): Primary | ICD-10-CM

## 2024-04-29 DIAGNOSIS — R31.29 MICROHEMATURIA: Primary | ICD-10-CM

## 2024-04-29 LAB
POC APPEARANCE, URINE: CLEAR
POC BILIRUBIN, URINE: NEGATIVE
POC BLOOD, URINE: ABNORMAL
POC COLOR, URINE: YELLOW
POC GLUCOSE, URINE: NEGATIVE MG/DL
POC KETONES, URINE: NEGATIVE MG/DL
POC LEUKOCYTES, URINE: ABNORMAL
POC NITRITE,URINE: NEGATIVE
POC PH, URINE: 7 PH
POC PROTEIN, URINE: NEGATIVE MG/DL
POC SPECIFIC GRAVITY, URINE: 1.02
POC UROBILINOGEN, URINE: 0.2 EU/DL

## 2024-04-29 PROCEDURE — 99204 OFFICE O/P NEW MOD 45 MIN: CPT | Performed by: GENERAL PRACTICE

## 2024-04-29 PROCEDURE — 1036F TOBACCO NON-USER: CPT | Performed by: GENERAL PRACTICE

## 2024-04-29 PROCEDURE — 3078F DIAST BP <80 MM HG: CPT | Performed by: GENERAL PRACTICE

## 2024-04-29 PROCEDURE — 52000 CYSTOURETHROSCOPY: CPT | Performed by: UROLOGY

## 2024-04-29 PROCEDURE — 81003 URINALYSIS AUTO W/O SCOPE: CPT | Performed by: UROLOGY

## 2024-04-29 PROCEDURE — 3074F SYST BP LT 130 MM HG: CPT | Performed by: GENERAL PRACTICE

## 2024-04-29 PROCEDURE — 1160F RVW MEDS BY RX/DR IN RCRD: CPT | Performed by: GENERAL PRACTICE

## 2024-04-29 PROCEDURE — 1159F MED LIST DOCD IN RCRD: CPT | Performed by: GENERAL PRACTICE

## 2024-04-29 ASSESSMENT — ENCOUNTER SYMPTOMS
LOSS OF SENSATION IN FEET: 0
DEPRESSION: 0
OCCASIONAL FEELINGS OF UNSTEADINESS: 0

## 2024-04-29 ASSESSMENT — SLEEP AND FATIGUE QUESTIONNAIRES
ESS-CHAD TOTAL SCORE: 5
HOW LIKELY ARE YOU TO NOD OFF OR FALL ASLEEP WHILE SITTING AND READING: SLIGHT CHANCE OF DOZING
HOW LIKELY ARE YOU TO NOD OFF OR FALL ASLEEP WHILE SITTING QUIETLY AFTER LUNCH WITHOUT ALCOHOL: WOULD NEVER DOZE
SATISFACTION_WITH_CURRENT_SLEEP_PATTERN: VERY SATISFIED
SITING INACTIVE IN A PUBLIC PLACE LIKE A CLASS ROOM OR A MOVIE THEATER: WOULD NEVER DOZE
SLEEP_PROBLEM_NOTICEABLE_TO_OTHERS: NOT AT ALL NOTICEABLE
HOW LIKELY ARE YOU TO NOD OFF OR FALL ASLEEP IN A CAR, WHILE STOPPED FOR A FEW MINUTES IN TRAFFIC: WOULD NEVER DOZE
HOW LIKELY ARE YOU TO NOD OFF OR FALL ASLEEP WHEN YOU ARE A PASSENGER IN A CAR FOR AN HOUR WITHOUT A BREAK: MODERATE CHANCE OF DOZING
HOW LIKELY ARE YOU TO NOD OFF OR FALL ASLEEP WHILE LYING DOWN TO REST IN THE AFTERNOON WHEN CIRCUMSTANCES PERMIT: SLIGHT CHANCE OF DOZING
HOW LIKELY ARE YOU TO NOD OFF OR FALL ASLEEP WHILE WATCHING TV: SLIGHT CHANCE OF DOZING
SLEEP_PROBLEM_INTERFERES_DAILY_ACTIVITIES: NOT AT ALL NOTICEABLE
HOW LIKELY ARE YOU TO NOD OFF OR FALL ASLEEP WHILE SITTING AND TALKING TO SOMEONE: WOULD NEVER DOZE
WORRIED_DISTRESSED_DUE_TO_SLEEP: NOT AT ALL NOTICEABLE

## 2024-04-29 ASSESSMENT — PATIENT HEALTH QUESTIONNAIRE - PHQ9
1. LITTLE INTEREST OR PLEASURE IN DOING THINGS: NOT AT ALL
SUM OF ALL RESPONSES TO PHQ9 QUESTIONS 1 AND 2: 0
2. FEELING DOWN, DEPRESSED OR HOPELESS: NOT AT ALL

## 2024-04-29 ASSESSMENT — COLUMBIA-SUICIDE SEVERITY RATING SCALE - C-SSRS
2. HAVE YOU ACTUALLY HAD ANY THOUGHTS OF KILLING YOURSELF?: NO
6. HAVE YOU EVER DONE ANYTHING, STARTED TO DO ANYTHING, OR PREPARED TO DO ANYTHING TO END YOUR LIFE?: NO
1. IN THE PAST MONTH, HAVE YOU WISHED YOU WERE DEAD OR WISHED YOU COULD GO TO SLEEP AND NOT WAKE UP?: NO

## 2024-04-29 NOTE — PROGRESS NOTES
History Of Present Illness  69-year-old female referred to me for microscopic hematuria  Previously evaluated by Mariely Sheikh PA-C  Endorses microscopic hematuria since 1985  New onset urgency/frequency as well  UA with micro: 11-20 RBCs, urine culture negative  RBUS 3/8/2024-no concerning findings, simple cyst left upper pole up to 6.9 cm.  Low PVR.  Started on vaginal estrogen    CTU 3/15/24 -no concerning findings    Past Medical History  She has a past medical history of Abnormal findings on diagnostic imaging of liver and biliary tract (07/02/2018), Abnormal weight loss (05/14/2015), Congenital malformation of breast, unspecified (05/03/2017), Dyspnea, unspecified (02/05/2015), Elevated blood-pressure reading, without diagnosis of hypertension (05/03/2017), Encounter for immunization (08/12/2021), Encounter for screening for malignant neoplasm of colon (10/03/2019), Gastro-esophageal reflux disease without esophagitis (08/12/2021), Long term (current) use of antibiotics (07/27/2016), Low back pain, unspecified (05/03/2017), Myositis, unspecified (05/03/2017), Nondisplaced fracture of greater tuberosity of left humerus, initial encounter for closed fracture (02/01/2021), Other conditions influencing health status (02/20/2019), Other general symptoms and signs (05/03/2017), Other specified anxiety disorders (04/13/2016), Pain in right shoulder (08/29/2019), Pain in unspecified joint (05/03/2017), Palpitations (05/03/2017), Personal history of other diseases of the musculoskeletal system and connective tissue (07/18/2018), Personal history of other medical treatment (07/18/2018), Snoring (05/03/2017), Strain of unspecified muscle, fascia and tendon at shoulder and upper arm level, left arm, initial encounter (08/29/2019), Unspecified cataract (04/27/2016), Unspecified cataract (04/27/2016), Unspecified cataract (04/27/2016), Unspecified cataract (04/27/2016), and Vitamin D deficiency, unspecified  (08/03/2017).    Surgical History  She has a past surgical history that includes Other surgical history (08/17/2022); Tubal ligation (04/27/2016); Tonsillectomy (04/27/2016); and Cataract extraction.     Social History  She reports that she has never smoked. She has never used smokeless tobacco. She reports current alcohol use. She reports that she does not use drugs.    Family History  Family History   Problem Relation Name Age of Onset    Hypertension Mother      Other (Diabetes mellitus) Father      Glaucoma Father      Hypertension Father          Allergies  Latex, Pollen extracts, and Tetracyclines    ROS: 12 system review was completed and is negative with the exception of those signs and symptoms noted in the history of present illness: A 12 system review was completed and is negative with the exception of those signs and symptoms noted in the history of present illness.     Exam:  General: in NAD, appears stated age  Head: normocephalic, atraumatic  Respiratory: normal effort, no use of accessory muscles  Cardiovascular: no edema noted  Skin: normal turgor, no rashes  Neurologic: grossly intact, oriented to person/place/time  Psychiatric: mode and affect appropriate    Patient ID: Codie Adhikari is a 69 y.o. female.    Cystoscopy    Date/Time: 4/29/2024 2:16 PM    Performed by: Gustavo Santiago MD  Authorized by: Gustavo Santiago MD      Comments:      The R/B/A to the following procedure were discussed and an informed consent was signed. A time-out was performed confirming the correct procedure, laterality (if applicable), and plan.    The patient was prepped and draped in the standard sterile fashion. A 16 Swazi flexible cystoscope was inserted through the urethra. The following finding were noted:    Urethra -normal  Bladder mucosa-normal without tumors, stones, or other lesions  Ureteral orifices -visualized in orthotopic position    The patient tolerated the procedure well.           Last Recorded  Vitals  Blood pressure 128/72, pulse 67, temperature 36.3 °C (97.3 °F), weight 70.8 kg (156 lb).    Lab Results   Component Value Date    CREATININE 0.91 03/25/2024    HGB 14.7 09/19/2023         ASSESSMENT/PLAN:  # Microscopic hematuria  -Benign evaluation  -Follow-up with Mariely Sheikh for lower urinary tract symptoms in ~6mo    Gustavo Santiago MD

## 2024-04-29 NOTE — PROGRESS NOTES
Patient: Codie Adhikari    [id number]  : 1955 -- AGE 69 y.o.    Provider: Ras Rivas DO     [facility]   Service Date: 2024              [facility]  New Visit Note        HISTORY OF PRESENT ILLNESS     The patient's referring provider is: Glenny Liu MD    HISTORY OF PRESENT ILLNESS   Codie Adhikari is a 69 y.o. female who presents to a [facility] Medicine Clinic for a sleep medicine evaluation with concerns of Sleep Study (Referral from primary needs to go over sleep study results. ).     PAST SLEEP HISTORY    Patient has been snoring for years. She has not tried any intervention. She had a sleep study done and would like to discuss her results.     Sleep schedule  on weekdays / work days:  Usual Bedtime: 12am-1am  Sleep latency: few min  Wake time : 6 am, cats   Total sleep time average/day: 6-7 hours/day  Awakenings: 2x per night, nocturia, short.   Naps: occasionally, few hrs.     Sleep schedule  on weekends/non work days :  Same as above.     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     Preferred sleeping position: SLEEP POSITION: prone    Sleep-related ROS:    Snoring:  y  Witnessed apneas: y       Gasping/ choking: y      Am Dry mouth:   y          Nasal congestion:    y     am headaches: y    Sleep is described as refreshing.     Daytime sleepiness: n  Fatigue or decreased energy: n  Difficulty remembering things in daytime: n  Difficulty staying focused in daytime: : n  Irritable during the day: n    Drowsy driving: n  Hx of car accident: n  Near-miss Car accident: n      RLS screen:  RLSSCREEN: - Sensations: Patient does not have unusual sensations in their extremities that cause an urge to move them     Sleep-related behaviors: DENIES    ESS: 5       REVIEW OF SYSTEMS     REVIEW OF SYSTEMS  Review of  Systems   All other systems reviewed and are negative.      ALLERGIES AND MEDICATIONS     ALLERGIES  Allergies   Allergen Reactions    Latex Itching    Pollen Extracts Cough     A lot of sneezing     Tetracyclines Nausea Only and Nausea/vomiting       MEDICATIONS  Current Outpatient Medications   Medication Sig Dispense Refill    albuterol 90 mcg/actuation inhaler Inhale 2 puffs every 4 hours if needed.      atorvastatin (Lipitor) 10 mg tablet Take 1 tablet (10 mg) by mouth once daily.      b complex vitamins capsule Take 1 capsule by mouth once daily.      buPROPion XL (Wellbutrin XL) 150 mg 24 hr tablet Take 1 tablet (150 mg) by mouth once daily.      calcium carbonate 600 mg calcium (1,500 mg) tablet Take 1 tablet (1,500 mg) by mouth once daily.      cholecalciferol (Vitamin D-3) 50 MCG (2000 UT) tablet Take 1 tablet (2,000 Units) by mouth once daily.      diclofenac sodium 3 % gel Apply sparingly to the affected areas twice daily      estradiol (Estrace) 0.01 % (0.1 mg/gram) vaginal cream Apply pea sized amount to vagina nightly for 1 week then every Monday and Thursday 42.5 g 5    glucosam/chond-msm1/C/nehemiah/bor (GLUCOSAMINE-CHOND-MSM COMPLEX ORAL) Take 1 tablet by mouth once daily. As directed.      hydroCHLOROthiazide (HYDRODiuril) 25 mg tablet Take 1 tablet (25 mg) by mouth once daily.      lutein 40 mg capsule Take 1 capsule by mouth once daily.      multivitamin (Daily Multi-Vitamin) tablet Take 1 tablet by mouth once daily.      NON FORMULARY Bupivacaine HCI SOSY      PROTEIN SUPPLEMENT ORAL 80% POWD; Use as directed.       No current facility-administered medications for this visit.         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), Dyspnea, unspecified (02/05/2015), Elevated blood-pressure reading,  without diagnosis of hypertension (05/03/2017), Encounter for immunization (08/12/2021), Encounter for screening for malignant neoplasm of colon (10/03/2019), Gastro-esophageal reflux disease without esophagitis (08/12/2021), Long term (current) use of antibiotics (07/27/2016), Low back pain, unspecified (05/03/2017), Myositis, unspecified (05/03/2017), Nondisplaced fracture of greater tuberosity of left humerus, initial encounter for closed fracture (02/01/2021), Other conditions influencing health status (02/20/2019), Other general symptoms and signs (05/03/2017), Other specified anxiety disorders (04/13/2016), Pain in right shoulder (08/29/2019), Pain in unspecified joint (05/03/2017), Palpitations (05/03/2017), Personal history of other diseases of the musculoskeletal system and connective tissue (07/18/2018), Personal history of other medical treatment (07/18/2018), Snoring (05/03/2017), Strain of unspecified muscle, fascia and tendon at shoulder and upper arm level, left arm, initial encounter (08/29/2019), Unspecified cataract (04/27/2016), Unspecified cataract (04/27/2016), Unspecified cataract (04/27/2016), Unspecified cataract (04/27/2016), and Vitamin D deficiency, unspecified (08/03/2017).      PAST SURGICAL HISTORY:  Past Surgical History:   Procedure Laterality Date    CATARACT EXTRACTION      OTHER SURGICAL HISTORY  08/17/2022    Cataract surgery    TONSILLECTOMY  04/27/2016    Tonsillectomy    TUBAL LIGATION  04/27/2016    Tubal Ligation       FAMILY HISTORY  Family History   Problem Relation Name Age of Onset    Hypertension Mother      Other (Diabetes mellitus) Father      Glaucoma Father      Hypertension Father       DOES/DOES NOT EC: does have a family history of sleep disorder.  Father hx of sleep apnea on pap therapy.     SOCIAL HISTORY  She  reports that she has never smoked. She has never used smokeless tobacco. She reports current alcohol use. She reports that she does not use drugs.  "    Caffeine consumption: 1 cup per day.       PHYSICAL EXAM     VITAL SIGNS: /75   Pulse 59   Resp 18   Ht 1.575 m (5' 2\")   Wt 71.1 kg (156 lb 11.2 oz)   SpO2 97%   BMI 28.66 kg/m²      PREVIOUS WEIGHTS:  Wt Readings from Last 3 Encounters:   04/29/24 71.1 kg (156 lb 11.2 oz)   04/25/24 70.8 kg (156 lb 1.4 oz)   03/21/24 70.8 kg (156 lb)       Physical Exam  Constitutional: Alert and oriented, cooperative, no obvious distress.   HENT: normocephalic.   Eyes: PERRLA, nonicteric   Neck: Supple, trachea midline   respiratory: CTA bilaterally, no wheezing/ crackles/ cough  Cardiac: no rub/ gallops  GI:BS in all 4 quadrants, Soft, nontender, no masses  musculoskeletal/ Extremities: No clubbing  integumentary: no significant rashes observed.   Neurologic: AOx3.   psychiatric: appropriate mood and affect.  Modified Mallampati: 4    RESULTS/DATA       ASSESSMENT/PLAN     Ms. Adhikari is a 69 y.o. female and  has a past medical history of Abnormal findings on diagnostic imaging of liver and biliary tract (07/02/2018), Abnormal weight loss (05/14/2015), Congenital malformation of breast, unspecified (05/03/2017), Dyspnea, unspecified (02/05/2015), Elevated blood-pressure reading, without diagnosis of hypertension (05/03/2017), Encounter for immunization (08/12/2021), Encounter for screening for malignant neoplasm of colon (10/03/2019), Gastro-esophageal reflux disease without esophagitis (08/12/2021), Long term (current) use of antibiotics (07/27/2016), Low back pain, unspecified (05/03/2017), Myositis, unspecified (05/03/2017), Nondisplaced fracture of greater tuberosity of left humerus, initial encounter for closed fracture (02/01/2021), Other conditions influencing health status (02/20/2019), Other general symptoms and signs (05/03/2017), Other specified anxiety disorders (04/13/2016), Pain in right shoulder (08/29/2019), Pain in unspecified joint (05/03/2017), Palpitations (05/03/2017), Personal history of " other diseases of the musculoskeletal system and connective tissue (07/18/2018), Personal history of other medical treatment (07/18/2018), Snoring (05/03/2017), Strain of unspecified muscle, fascia and tendon at shoulder and upper arm level, left arm, initial encounter (08/29/2019), Unspecified cataract (04/27/2016), Unspecified cataract (04/27/2016), Unspecified cataract (04/27/2016), Unspecified cataract (04/27/2016), and Vitamin D deficiency, unspecified (08/03/2017). She was referred to the [facility] for evaluation of sleep apnea.     Problem List Items Addressed This Visit    None  Visit Diagnoses       LINDA (obstructive sleep apnea)                Problem List and Orders  Pmhx includes HTN, asthma.      1- LINDA  HST 4/4/24 --> mild LINDA  REI3% 7.5, REI4% 6.8, ROSE 0. SpO2 estefany 85%.     Symptoms include nocturia, snoring, night time awakenings, gasping/ choking, morning headaches.      Reviewed and discussed the above sleep study results and management options in details. All questions answered, patient verbalizes understanding.     -Start Autopap 4-12cwp    -do not drive or operate heavy machinery if drowsy.  -avoid sleeping on your back.   -avoid sedating substances/ medication, alcohol, illicit drugs and tobacco.    2- Overweight   counseled on eating a healthy diet and exercising as tolerated.    Follow up in 3-4 months or sooner as needed.

## 2024-04-29 NOTE — PATIENT INSTRUCTIONS
[facility] Sleep Medicine   [facility]  [address] [address]  [phone]       NAME: Codie Adhikari   DATE: 4/29/2024     Your Sleep Provider Today: Ras Rivas DO  Your Primary Care Physician: Glenny Liu MD   Your Referring Provider: Glenny Liu MD    DIAGNOSIS:   1. LINDA (obstructive sleep apnea)  Referral to Adult Sleep Medicine          Thank you for coming to the Sleep Medicine Clinic today! Your sleep medicine provider today was: Ras Rivas DO Below is a summary of your treatment plan, other important information, and our contact numbers:      TREATMENT PLAN     Follow up in 3-4 months or sooner as needed.     Instructions - Common LINDA Recs: - For your sleep apnea, continue to use your PAP every night and use it whenever you are sleeping.   - Avoid alcohol or sedatives several hours prior to sleeping.   - Get additional supplies for your PAP (e.g., mask, hose, filters) every 3 months or as your insurance allows from your IMT company. Replacement cushions for your PAP mask can be requested monthly if airseals are an issue.  - Remember to clean your mask, tubings, and water chamber regularly as instructed.  - Avoid driving or operating heavy machinery when drowsy. A person driving while sleepy is five (5) times more likely to have an accident. If you feel sleepy, pull over and take a short power nap (sleep for less than 30 minutes). Otherwise, ask somebody to drive you.      IMPORTANT INFORMATION     Call 911 for medical emergencies.  Our offices are generally open from Monday-Friday, 9 am - 5 pm.  If you need to get in touch with me, you may either call me and my team(number is below) or you can use [employer].  If a referral for a test, for CPAP, or for another specialist was made, and you have not heard about scheduling this within a week, please call scheduling at [phone] ([phone]).  If you are unable to make your  appointment for clinic or an overnight study, kindly call the office at least 48 hours in advance to cancel and reschedule.  If you are on CPAP, please bring your device's card or the device to each clinic appointment.   There are no supporting services by either the sleep doctors or their staff on weekends and Holidays, or after 5 PM on weekdays.   If you have been asked to come to a sleep study, make sure you bring toiletries, a comfy pillow, and any nighttime medications that you may regularly take. Also be sure to eat dinner before you arrive. We generally do not provide meals.      PRESCRIPTIONS     We require 7 days advanced notice for prescription refills. If we do not receive the request in this time, we cannot guarantee that your medication will be refilled in time.      IMPORTANT PHONE NUMBERS     Sleep Medicine Clinic Fax: [phone]  Appointments (for Pediatric Sleep Clinic): [phone] - option 1  Appointments (for Adult Sleep Clinic): [phone] - option 2  Appointments (For Sleep Studies): [phone] - option 3  Behavioral Sleep Medicine: [phone]  Sleep Surgery: [phone]  ENT (Otolaryngology): [phone]  Headache Clinic (Neurology): [phone]  Neurology: [phone]  Psychiatry: [phone]  Pulmonary Function Testing (PFT) Center: [phone]  Pulmonary Medicine: [phone]  [employer] (DME): [phone]  [employer] (DME): [phone]  [facility] [phone]      OUR ADULT SLEEP MEDICINE TEAM   Please do not hesitate to call the office or sleep nurse with any questions between appointments:    Adult Sleep Nurses (Nani Redd, RN and Arlet Tan RN):  For clinical questions and refilling prescriptions: [phone]  Email sleep diaries and other documents at: [email or web address]    Adult Sleep Medicine Secretaries:  Klarissa Caruso (For [place]/Evelyn): P: [phone]  F:  "[phone]      OUR SLEEP TESTING LOCATIONS     Our team will contact you to schedule your sleep study, however, you can contact us as follow:  Main Phone Line (scheduling only): [phone]), option 3  Adult and Pediatric Locations   [place] (6 years and older): [facility] - 4th floor ([address] [facility]) After hours line: [phone]  [facility] (Main campus: All ages): [facility], 6th floor. After hours line: [phone]   [place] (5 years and older; younger considered on case-by-case basis): [address]; [address], [address]. Scheduling  After hours line: [phone]   [place] (6 years and older): [address]; [address]; [address]   [place] (6 years and older): [address] [facility], [address]  After hours line: [phone]   [place] (13 years and older) in [place]: [address], 2nd floor  After hours line: [phone]   [place] (13 year and older): [address]  After hours line: [phone]     Adult Only Locations:   [place] (18 years and older): [address] [facility], 2nd floor   [place] (18 years and older): [address] [facility]; 4th floor  After hours line: [phone]  [facility] (18 years and older) at [place]: [address] [facility]  After hours line: [phone]          CONTACTING YOUR SLEEP MEDICINE PROVIDER     Send a message directly to your provider through \"My Chart\", which is the email service through your  Records Account: [email or web address]   Call [phone] and leave a message. One of the administrative assistants will forward the message to your sleep medicine provider through \"My Chart\" and/or email.     Your sleep medicine provider for this visit was: Ras Rivas DO        "

## 2024-05-28 ENCOUNTER — HOSPITAL ENCOUNTER (OUTPATIENT)
Dept: RADIOLOGY | Facility: EXTERNAL LOCATION | Age: 69
Discharge: HOME | End: 2024-05-28
Payer: MEDICARE

## 2024-05-28 DIAGNOSIS — S29.9XXA RIB INJURY: ICD-10-CM

## 2024-06-28 ENCOUNTER — PATIENT MESSAGE (OUTPATIENT)
Dept: SLEEP MEDICINE | Facility: CLINIC | Age: 69
End: 2024-06-28
Payer: MEDICARE

## 2024-07-12 ENCOUNTER — OFFICE VISIT (OUTPATIENT)
Dept: OPHTHALMOLOGY | Facility: CLINIC | Age: 69
End: 2024-07-12
Payer: MEDICARE

## 2024-07-12 DIAGNOSIS — H35.371 EPIRETINAL MEMBRANE (ERM) OF RIGHT EYE: Primary | ICD-10-CM

## 2024-07-12 PROCEDURE — 99213 OFFICE O/P EST LOW 20 MIN: CPT

## 2024-07-12 PROCEDURE — 92134 CPTRZ OPH DX IMG PST SGM RTA: CPT | Mod: BILATERAL PROCEDURE

## 2024-07-12 ASSESSMENT — ENCOUNTER SYMPTOMS: EYES NEGATIVE: 1

## 2024-07-12 ASSESSMENT — CUP TO DISC RATIO
OS_RATIO: .3
OD_RATIO: .3

## 2024-07-12 ASSESSMENT — VISUAL ACUITY
METHOD: SNELLEN - LINEAR
OD_CC: 20/20-2
OS_CC: 20/20
CORRECTION_TYPE: GLASSES

## 2024-07-12 ASSESSMENT — TONOMETRY
OS_IOP_MMHG: 16
OD_IOP_MMHG: 16
IOP_METHOD: GOLDMANN APPLANATION

## 2024-07-12 ASSESSMENT — CONF VISUAL FIELD
OD_INFERIOR_NASAL_RESTRICTION: 0
OS_INFERIOR_NASAL_RESTRICTION: 0
OS_SUPERIOR_NASAL_RESTRICTION: 0
OD_NORMAL: 1
OD_INFERIOR_TEMPORAL_RESTRICTION: 0
OS_INFERIOR_TEMPORAL_RESTRICTION: 0
OS_SUPERIOR_TEMPORAL_RESTRICTION: 0
OS_NORMAL: 1
OD_SUPERIOR_NASAL_RESTRICTION: 0
OD_SUPERIOR_TEMPORAL_RESTRICTION: 0

## 2024-07-12 ASSESSMENT — SLIT LAMP EXAM - LIDS
COMMENTS: NORMAL
COMMENTS: NORMAL

## 2024-07-12 ASSESSMENT — EXTERNAL EXAM - RIGHT EYE: OD_EXAM: NORMAL

## 2024-07-12 ASSESSMENT — EXTERNAL EXAM - LEFT EYE: OS_EXAM: NORMAL

## 2024-07-12 NOTE — PROGRESS NOTES
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

## 2024-08-29 ENCOUNTER — APPOINTMENT (OUTPATIENT)
Dept: SLEEP MEDICINE | Facility: CLINIC | Age: 69
End: 2024-08-29
Payer: MEDICARE

## 2024-08-29 VITALS
DIASTOLIC BLOOD PRESSURE: 83 MMHG | WEIGHT: 166.1 LBS | BODY MASS INDEX: 29.43 KG/M2 | RESPIRATION RATE: 18 BRPM | HEIGHT: 63 IN | HEART RATE: 66 BPM | SYSTOLIC BLOOD PRESSURE: 126 MMHG

## 2024-08-29 DIAGNOSIS — I10 ESSENTIAL HYPERTENSION: ICD-10-CM

## 2024-08-29 DIAGNOSIS — G47.33 OSA (OBSTRUCTIVE SLEEP APNEA): Primary | ICD-10-CM

## 2024-08-29 PROCEDURE — G2211 COMPLEX E/M VISIT ADD ON: HCPCS | Performed by: GENERAL PRACTICE

## 2024-08-29 PROCEDURE — 1159F MED LIST DOCD IN RCRD: CPT | Performed by: GENERAL PRACTICE

## 2024-08-29 PROCEDURE — 3008F BODY MASS INDEX DOCD: CPT | Performed by: GENERAL PRACTICE

## 2024-08-29 PROCEDURE — 3074F SYST BP LT 130 MM HG: CPT | Performed by: GENERAL PRACTICE

## 2024-08-29 PROCEDURE — 3079F DIAST BP 80-89 MM HG: CPT | Performed by: GENERAL PRACTICE

## 2024-08-29 PROCEDURE — 1036F TOBACCO NON-USER: CPT | Performed by: GENERAL PRACTICE

## 2024-08-29 PROCEDURE — 99214 OFFICE O/P EST MOD 30 MIN: CPT | Performed by: GENERAL PRACTICE

## 2024-08-29 ASSESSMENT — SLEEP AND FATIGUE QUESTIONNAIRES
ESS-CHAD TOTAL SCORE: 7
HOW LIKELY ARE YOU TO NOD OFF OR FALL ASLEEP WHILE WATCHING TV: HIGH CHANCE OF DOZING
HOW LIKELY ARE YOU TO NOD OFF OR FALL ASLEEP WHILE LYING DOWN TO REST IN THE AFTERNOON WHEN CIRCUMSTANCES PERMIT: MODERATE CHANCE OF DOZING
HOW LIKELY ARE YOU TO NOD OFF OR FALL ASLEEP WHILE SITTING AND READING: WOULD NEVER DOZE
HOW LIKELY ARE YOU TO NOD OFF OR FALL ASLEEP WHILE SITTING QUIETLY AFTER LUNCH WITHOUT ALCOHOL: WOULD NEVER DOZE
HOW LIKELY ARE YOU TO NOD OFF OR FALL ASLEEP WHILE SITTING AND TALKING TO SOMEONE: WOULD NEVER DOZE
HOW LIKELY ARE YOU TO NOD OFF OR FALL ASLEEP WHEN YOU ARE A PASSENGER IN A CAR FOR AN HOUR WITHOUT A BREAK: MODERATE CHANCE OF DOZING
SITING INACTIVE IN A PUBLIC PLACE LIKE A CLASS ROOM OR A MOVIE THEATER: WOULD NEVER DOZE
HOW LIKELY ARE YOU TO NOD OFF OR FALL ASLEEP IN A CAR, WHILE STOPPED FOR A FEW MINUTES IN TRAFFIC: WOULD NEVER DOZE

## 2024-08-29 ASSESSMENT — PATIENT HEALTH QUESTIONNAIRE - PHQ9
2. FEELING DOWN, DEPRESSED OR HOPELESS: NOT AT ALL
1. LITTLE INTEREST OR PLEASURE IN DOING THINGS: NOT AT ALL
SUM OF ALL RESPONSES TO PHQ9 QUESTIONS 1 AND 2: 0

## 2024-08-29 ASSESSMENT — ENCOUNTER SYMPTOMS
OCCASIONAL FEELINGS OF UNSTEADINESS: 0
DEPRESSION: 0
LOSS OF SENSATION IN FEET: 0

## 2024-08-29 NOTE — PROGRESS NOTES
Patient: Codie Adhikari    [id number]  : 1955 -- AGE 69 y.o.    Provider: Ras Rivas DO     [facility]   Service Date: 2024              [facility]  Follow up Visit Note        HISTORY OF PRESENT ILLNESS     HISTORY OF PRESENT ILLNESS   Codie Adhikari is a 69 y.o. female who presents to a [facility] for a sleep medicine evaluation with concerns of Follow-up (Using cpap and ok with supplies ).     PAST SLEEP HISTORY    Patient has been snoring for years. She has not tried any intervention. She had a sleep study done and would like to discuss her results.     24  Patient has been trying to use pap therapy regularly. Patient finds mask and settings to be comfortable.     sometimes she feels that there is water in her tubing.    Pap benefit: decreased/ eliminated snoring/ witnessed apneas/ gasping/ choking/ night time awakenings/ morning headaches.     Pap issues: admits to dry mouth. Denies skin irritation.     Sleep schedule  on weekdays / work days:  Usual Bedtime: 12am-1am  Sleep latency: few min  Wake time : 6 -7am, cats   Total sleep time average/day: 7 hours/day  Awakenings: 1x per night, nocturia, short.   Naps: almost daily, few hrs. 1-2hrs, 2pm    Sleep schedule  on weekends/non work days :  Same as above.     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     Preferred sleeping position: SLEEP POSITION: prone    Sleep-related ROS:    Snoring:  n  Witnessed apneas: n   Gasping/ choking: n    Am Dry mouth:   y          Nasal congestion:    y     am headaches: n    Sleep is described as refreshing.     Daytime sleepiness: sometimes  Fatigue or decreased energy: sometimes   Difficulty remembering things in daytime: n  Difficulty staying focused in daytime: : n  Irritable during the day: n    Drowsy  driving: n  Hx of car accident: n  Near-miss Car accident: n      RLS screen:  RLSSCREEN: - Sensations: Patient does not have unusual sensations in their extremities that cause an urge to move them     Sleep-related behaviors: DENIES    ESS: 7       REVIEW OF SYSTEMS     REVIEW OF SYSTEMS  Review of Systems   All other systems reviewed and are negative.      ALLERGIES AND MEDICATIONS     ALLERGIES  Allergies   Allergen Reactions    Latex Itching    Pollen Extracts Cough     A lot of sneezing     Tetracyclines Nausea Only and Nausea/vomiting       MEDICATIONS  Current Outpatient Medications   Medication Sig Dispense Refill    albuterol 90 mcg/actuation inhaler Inhale 2 puffs every 4 hours if needed.      atorvastatin (Lipitor) 10 mg tablet Take 1 tablet (10 mg) by mouth once daily.      b complex vitamins capsule Take 1 capsule by mouth once daily.      buPROPion XL (Wellbutrin XL) 150 mg 24 hr tablet Take 1 tablet (150 mg) by mouth once daily.      calcium carbonate 600 mg calcium (1,500 mg) tablet Take 1 tablet (1,500 mg) by mouth once daily.      cholecalciferol (Vitamin D-3) 50 MCG (2000 UT) tablet Take 1 tablet (2,000 Units) by mouth once daily.      diclofenac sodium 3 % gel Apply sparingly to the affected areas twice daily      estradiol (Estrace) 0.01 % (0.1 mg/gram) vaginal cream Apply pea sized amount to vagina nightly for 1 week then every Monday and Thursday 42.5 g 5    glucosam/chond-msm1/C/nehemiah/bor (GLUCOSAMINE-CHOND-MSM COMPLEX ORAL) Take 1 tablet by mouth once daily. As directed.      hydroCHLOROthiazide (HYDRODiuril) 25 mg tablet Take 1 tablet (25 mg) by mouth once daily.      lutein 40 mg capsule Take 1 capsule by mouth once daily.      multivitamin (Daily Multi-Vitamin) tablet Take 1 tablet by mouth once daily.      NON FORMULARY Bupivacaine HCI SOSY      PROTEIN SUPPLEMENT ORAL 80% POWD; Use as directed.       No current facility-administered medications for this visit.         PAST HISTORY      PAST MEDICAL HISTORY  She  has a past medical history of Abnormal findings on diagnostic imaging of liver and biliary tract (07/02/2018), Abnormal weight loss (05/14/2015), Congenital malformation of breast, unspecified (05/03/2017), Dyspnea, unspecified (02/05/2015), Elevated blood-pressure reading, without diagnosis of hypertension (05/03/2017), Encounter for immunization (08/12/2021), Encounter for screening for malignant neoplasm of colon (10/03/2019), Gastro-esophageal reflux disease without esophagitis (08/12/2021), Long term (current) use of antibiotics (07/27/2016), Low back pain, unspecified (05/03/2017), Myositis, unspecified (05/03/2017), Nondisplaced fracture of greater tuberosity of left humerus, initial encounter for closed fracture (02/01/2021), Other conditions influencing health status (02/20/2019), Other general symptoms and signs (05/03/2017), Other specified anxiety disorders (04/13/2016), Pain in right shoulder (08/29/2019), Pain in unspecified joint (05/03/2017), Palpitations (05/03/2017), Personal history of other diseases of the musculoskeletal system and connective tissue (07/18/2018), Personal history of other medical treatment (07/18/2018), Snoring (05/03/2017), Strain of unspecified muscle, fascia and tendon at shoulder and upper arm level, left arm, initial encounter (08/29/2019), Unspecified cataract (04/27/2016), Unspecified cataract (04/27/2016), Unspecified cataract (04/27/2016), Unspecified cataract (04/27/2016), and Vitamin D deficiency, unspecified (08/03/2017).      PAST SURGICAL HISTORY:  Past Surgical History:   Procedure Laterality Date    CATARACT EXTRACTION      OTHER SURGICAL HISTORY  08/17/2022    Cataract surgery    TONSILLECTOMY  04/27/2016    Tonsillectomy    TUBAL LIGATION  04/27/2016    Tubal Ligation       FAMILY HISTORY  Family History   Problem Relation Name Age of Onset    Hypertension Mother      Other (Diabetes mellitus) Father      Glaucoma Father       "Hypertension Father       DOES/DOES NOT EC: does have a family history of sleep disorder.  Father hx of sleep apnea on pap therapy.     SOCIAL HISTORY  She  reports that she has never smoked. She has never used smokeless tobacco. She reports current alcohol use. She reports that she does not use drugs.     Caffeine consumption: 1 cup per day.     PHYSICAL EXAM     VITAL SIGNS: /83   Pulse 66   Resp 18   Ht 1.588 m (5' 2.5\")   Wt 75.3 kg (166 lb 1.6 oz)   BMI 29.90 kg/m²      PREVIOUS WEIGHTS:  Wt Readings from Last 3 Encounters:   08/29/24 75.3 kg (166 lb 1.6 oz)   04/29/24 70.8 kg (156 lb)   04/29/24 71.1 kg (156 lb 11.2 oz)       Physical Exam  Constitutional: Alert and oriented, cooperative, no obvious distress.   HENT: normocephalic.   Neurologic: AOx3.   psychiatric: appropriate mood and affect.  Integumentary: no significant rashes observed over pap mask area.     RESULTS/DATA       ASSESSMENT/PLAN     Ms. Adhikari is a 69 y.o. female and  has a past medical history of Abnormal findings on diagnostic imaging of liver and biliary tract (07/02/2018), Abnormal weight loss (05/14/2015), Congenital malformation of breast, unspecified (05/03/2017), Dyspnea, unspecified (02/05/2015), Elevated blood-pressure reading, without diagnosis of hypertension (05/03/2017), Encounter for immunization (08/12/2021), Encounter for screening for malignant neoplasm of colon (10/03/2019), Gastro-esophageal reflux disease without esophagitis (08/12/2021), Long term (current) use of antibiotics (07/27/2016), Low back pain, unspecified (05/03/2017), Myositis, unspecified (05/03/2017), Nondisplaced fracture of greater tuberosity of left humerus, initial encounter for closed fracture (02/01/2021), Other conditions influencing health status (02/20/2019), Other general symptoms and signs (05/03/2017), Other specified anxiety disorders (04/13/2016), Pain in right shoulder (08/29/2019), Pain in unspecified joint (05/03/2017), " Palpitations (05/03/2017), Personal history of other diseases of the musculoskeletal system and connective tissue (07/18/2018), Personal history of other medical treatment (07/18/2018), Snoring (05/03/2017), Strain of unspecified muscle, fascia and tendon at shoulder and upper arm level, left arm, initial encounter (08/29/2019), Unspecified cataract (04/27/2016), Unspecified cataract (04/27/2016), Unspecified cataract (04/27/2016), Unspecified cataract (04/27/2016), and Vitamin D deficiency, unspecified (08/03/2017). She is following up on sleep apnea.     Problem List Items Addressed This Visit    None    Problem List and Orders  Pmhx includes HTN, asthma.      1- LINDA  HST 4/4/24 --> mild LINDA  REI3% 7.5, REI4% 6.8, ROSE 0. SpO2 estefany 85%.     Symptoms include nocturia, snoring, night time awakenings, gasping/ choking, morning headaches.      Reviewed and discussed the above sleep study results and management options in details. All questions answered, patient verbalizes understanding.     -adjusting from. Autopap 4-12cwp, rAHI 7.6, median pressure 12.3, max avg 14.9, acceptable compliance, good seal, to --> Autopap 12-18cwp.     You can use a chin strap to help with your dry mouth, you can also adjust your humidity level to your comfort. You can use a nasal spray to help you breathe better from your nose.    -sometimes she feels that there is water in her tubing --> adjust humidity and temperature to comfort.     -do not drive or operate heavy machinery if drowsy.  -avoid sleeping on your back.   -avoid sedating substances/ medication, alcohol, illicit drugs and tobacco.    2- Overweight   counseled on eating a healthy diet and exercising as tolerated.    3- HTN  Stable  Follow up with pcp  Today bp 126/ 83    Follow up in 6 months or sooner as needed.

## 2024-09-23 ENCOUNTER — APPOINTMENT (OUTPATIENT)
Dept: SLEEP MEDICINE | Facility: CLINIC | Age: 69
End: 2024-09-23
Payer: MEDICARE

## 2024-10-23 DIAGNOSIS — R39.15 URINARY URGENCY: ICD-10-CM

## 2024-10-23 DIAGNOSIS — R35.0 URINARY FREQUENCY: ICD-10-CM

## 2024-10-28 ENCOUNTER — LAB (OUTPATIENT)
Dept: LAB | Facility: LAB | Age: 69
End: 2024-10-28
Payer: MEDICARE

## 2024-11-11 ENCOUNTER — PATIENT MESSAGE (OUTPATIENT)
Dept: PRIMARY CARE | Facility: CLINIC | Age: 69
End: 2024-11-11
Payer: MEDICARE

## 2024-11-11 DIAGNOSIS — F32.A DEPRESSION, UNSPECIFIED DEPRESSION TYPE: ICD-10-CM

## 2024-11-11 RX ORDER — BUPROPION HYDROCHLORIDE 150 MG/1
150 TABLET ORAL DAILY
Qty: 90 TABLET | Refills: 3 | Status: SHIPPED | OUTPATIENT
Start: 2024-11-11 | End: 2025-11-11

## 2025-01-21 NOTE — PROGRESS NOTES
"Subjective   Codie Adhikari is a 69 y.o. female who presents for the following: Skin Check (LV: 1/17/24: FBSE. H/o AK's, h/o NMSC/) and Suspicious Skin Lesion (Rough spots located Left eyebrow, right postauricular area and right chin/Denies pain or bleeding)    Skin Cancer History  Unknown type of skin cancer - Right proximal FA, lesion \"burned off\" several years ago  BCC - left cheek s/p Mohs 2011  AK's     Family History of Skin Cancer  Father-NMSC    The following portions of the chart were reviewed this encounter and updated as appropriate:         Review of Systems: Pertinent items are noted in HPI.    Objective   Well appearing patient in no apparent distress; mood and affect are within normal limits.    A full examination was performed including scalp, head, eyes, ears, nose, lips, neck, chest, axillae, abdomen, back, buttocks, bilateral upper extremities, bilateral lower extremities, hands, feet, fingers, toes, fingernails, and toenails. All findings within normal limits unless otherwise noted below.    Well healed scar(s) at site(s) of prior treatment    Scattered cherry-red papule(s).    Scattered tan macules in sun-exposed areas.    Stuck on verrucous, tan-brown papules and plaques.      Scattered, uniform and benign-appearing, regular brown melanocytic papules and macules.      Assessment/Plan   Personal history of skin cancer    No evidence of recurrence at site(s) of prior treatment  Continue photoprotection with sun-protective clothing and sunscreen SPF 30+ daily  Continue routine self-skin examination  Continue to follow up every 6-12 months for routine FBSE or earlier prn for new/changing/concerning lesions       Hemangioma of skin    Reassured of benign nature of lesions    Lentigo    Reassured of benign nature of lesions    Seborrheic keratosis    Reassured of benign nature of lesions    Multiple benign nevi    Reassured of benign nature of lesions    Continue to follow up yearly for routine " FBSE or earlier prn for new/changing/concerning lesions     Scribe Attestation  By signing my name below, I, Aby Rodriguez LPN , Scribe   attest that this documentation has been prepared under the direction and in the presence of Christiano Alvarez MD.

## 2025-01-22 ENCOUNTER — APPOINTMENT (OUTPATIENT)
Dept: DERMATOLOGY | Facility: CLINIC | Age: 70
End: 2025-01-22
Payer: MEDICARE

## 2025-01-22 DIAGNOSIS — L82.1 SEBORRHEIC KERATOSIS: ICD-10-CM

## 2025-01-22 DIAGNOSIS — Z85.828 PERSONAL HISTORY OF SKIN CANCER: Primary | ICD-10-CM

## 2025-01-22 DIAGNOSIS — D22.9 MULTIPLE BENIGN NEVI: ICD-10-CM

## 2025-01-22 DIAGNOSIS — D18.01 HEMANGIOMA OF SKIN: ICD-10-CM

## 2025-01-22 DIAGNOSIS — L81.4 LENTIGO: ICD-10-CM

## 2025-01-22 PROCEDURE — 1159F MED LIST DOCD IN RCRD: CPT | Performed by: STUDENT IN AN ORGANIZED HEALTH CARE EDUCATION/TRAINING PROGRAM

## 2025-01-22 PROCEDURE — 99213 OFFICE O/P EST LOW 20 MIN: CPT | Performed by: STUDENT IN AN ORGANIZED HEALTH CARE EDUCATION/TRAINING PROGRAM

## 2025-02-12 ENCOUNTER — PATIENT MESSAGE (OUTPATIENT)
Dept: PRIMARY CARE | Facility: CLINIC | Age: 70
End: 2025-02-12
Payer: MEDICARE

## 2025-02-12 DIAGNOSIS — I10 ESSENTIAL HYPERTENSION: Primary | ICD-10-CM

## 2025-02-12 DIAGNOSIS — F32.A DEPRESSION, UNSPECIFIED DEPRESSION TYPE: ICD-10-CM

## 2025-02-16 RX ORDER — ATORVASTATIN CALCIUM 10 MG/1
10 TABLET, FILM COATED ORAL DAILY
Qty: 90 TABLET | Refills: 3 | Status: SHIPPED | OUTPATIENT
Start: 2025-02-16

## 2025-02-16 RX ORDER — BUPROPION HYDROCHLORIDE 150 MG/1
150 TABLET ORAL DAILY
Qty: 90 TABLET | Refills: 3 | Status: SHIPPED | OUTPATIENT
Start: 2025-02-16 | End: 2026-02-16

## 2025-02-16 RX ORDER — HYDROCHLOROTHIAZIDE 25 MG/1
25 TABLET ORAL DAILY
Qty: 90 TABLET | Refills: 3 | Status: SHIPPED | OUTPATIENT
Start: 2025-02-16 | End: 2026-02-16

## 2025-02-28 ENCOUNTER — APPOINTMENT (OUTPATIENT)
Dept: SLEEP MEDICINE | Facility: CLINIC | Age: 70
End: 2025-02-28
Payer: MEDICARE

## 2025-02-28 VITALS
BODY MASS INDEX: 32.09 KG/M2 | SYSTOLIC BLOOD PRESSURE: 118 MMHG | DIASTOLIC BLOOD PRESSURE: 73 MMHG | OXYGEN SATURATION: 96 % | RESPIRATION RATE: 18 BRPM | TEMPERATURE: 98.3 F | HEIGHT: 63 IN | WEIGHT: 181.08 LBS | HEART RATE: 57 BPM

## 2025-02-28 DIAGNOSIS — G47.33 OSA (OBSTRUCTIVE SLEEP APNEA): Primary | ICD-10-CM

## 2025-02-28 DIAGNOSIS — E66.9 OBESITY (BMI 30-39.9): ICD-10-CM

## 2025-02-28 DIAGNOSIS — I10 ESSENTIAL HYPERTENSION: ICD-10-CM

## 2025-02-28 PROCEDURE — 3078F DIAST BP <80 MM HG: CPT | Performed by: GENERAL PRACTICE

## 2025-02-28 PROCEDURE — 3008F BODY MASS INDEX DOCD: CPT | Performed by: GENERAL PRACTICE

## 2025-02-28 PROCEDURE — G2211 COMPLEX E/M VISIT ADD ON: HCPCS | Performed by: GENERAL PRACTICE

## 2025-02-28 PROCEDURE — 99214 OFFICE O/P EST MOD 30 MIN: CPT | Performed by: GENERAL PRACTICE

## 2025-02-28 PROCEDURE — 1159F MED LIST DOCD IN RCRD: CPT | Performed by: GENERAL PRACTICE

## 2025-02-28 PROCEDURE — 3074F SYST BP LT 130 MM HG: CPT | Performed by: GENERAL PRACTICE

## 2025-02-28 PROCEDURE — 1036F TOBACCO NON-USER: CPT | Performed by: GENERAL PRACTICE

## 2025-02-28 ASSESSMENT — SLEEP AND FATIGUE QUESTIONNAIRES
HOW LIKELY ARE YOU TO NOD OFF OR FALL ASLEEP WHILE SITTING AND TALKING TO SOMEONE: WOULD NEVER DOZE
HOW LIKELY ARE YOU TO NOD OFF OR FALL ASLEEP WHILE WATCHING TV: SLIGHT CHANCE OF DOZING
HOW LIKELY ARE YOU TO NOD OFF OR FALL ASLEEP IN A CAR, WHILE STOPPED FOR A FEW MINUTES IN TRAFFIC: WOULD NEVER DOZE
HOW LIKELY ARE YOU TO NOD OFF OR FALL ASLEEP WHILE SITTING INACTIVE IN A PUBLIC PLACE: WOULD NEVER DOZE
HOW LIKELY ARE YOU TO NOD OFF OR FALL ASLEEP WHILE LYING DOWN TO REST IN THE AFTERNOON WHEN CIRCUMSTANCES PERMIT: SLIGHT CHANCE OF DOZING
HOW LIKELY ARE YOU TO NOD OFF OR FALL ASLEEP WHEN YOU ARE A PASSENGER IN A CAR FOR AN HOUR WITHOUT A BREAK: WOULD NEVER DOZE
ESS TOTAL SCORE: 3
HOW LIKELY ARE YOU TO NOD OFF OR FALL ASLEEP WHILE SITTING AND READING: SLIGHT CHANCE OF DOZING
HOW LIKELY ARE YOU TO NOD OFF OR FALL ASLEEP WHILE SITTING QUIETLY AFTER LUNCH WITHOUT ALCOHOL: WOULD NEVER DOZE

## 2025-02-28 ASSESSMENT — COLUMBIA-SUICIDE SEVERITY RATING SCALE - C-SSRS
2. HAVE YOU ACTUALLY HAD ANY THOUGHTS OF KILLING YOURSELF?: NO
1. IN THE PAST MONTH, HAVE YOU WISHED YOU WERE DEAD OR WISHED YOU COULD GO TO SLEEP AND NOT WAKE UP?: NO
6. HAVE YOU EVER DONE ANYTHING, STARTED TO DO ANYTHING, OR PREPARED TO DO ANYTHING TO END YOUR LIFE?: NO

## 2025-02-28 NOTE — PROGRESS NOTES
Patient: Codie Adhikari    [id number]  : 1955 -- AGE 69 y.o.    Provider: Ras Rivas DO     Location [facility]   Service Date: 2025              [facility]  Follow up Visit Note        HISTORY OF PRESENT ILLNESS     HISTORY OF PRESENT ILLNESS   Codie Adhikari is a 69 y.o. female who presents to a [facility] for a sleep medicine evaluation with concerns of Sleep Apnea and Follow-up.     PAST SLEEP HISTORY    Patient has been snoring for years. She has not tried any intervention. She had a sleep study done and would like to discuss her results.     24  Patient has been trying to use pap therapy regularly. Patient finds mask and settings to be comfortable.     sometimes she feels that there is water in her tubing.    Pap benefit: decreased/ eliminated snoring/ witnessed apneas/ gasping/ choking/ night time awakenings/ morning headaches.     Pap issues: admits to dry mouth. Denies skin irritation.     25    Patient has been trying to use pap therapy regularly. Patient finds mask and settings to be comfortable.     She went down on the humidity and increased the tube temp and this resolved the issue of water in the tubing.     Pap benefit: decreased/ eliminated: snoring/ witnessed apneas/ gasping/ choking/ night time awakenings/ morning headaches/ nocturia/ daytime sleepiness. She requires less naps.     Pap issues: admits to dry mouth sometimes. Denies skin irritation.     Sleep schedule  on weekdays / work days:  Usual Bedtime: 12am-1am  Sleep latency: few min  Wake time : 7-8 am, cats   Total sleep time average/day: 7 hours/day  Awakenings: 2x per week, kitten/ nocturia, short.   Naps: 2x per week, 15min-1hr, 2pm    Sleep schedule  on weekends/non work days :  Same as above.     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 Goes to the [employer]     Preferred sleeping position: SLEEP POSITION: prone    Sleep-related ROS:    Snoring:  n  Witnessed apneas: n   Gasping/ choking: n    Am Dry mouth:   y sometimes          Nasal congestion:   y     am headaches: n    Sleep is described as refreshing.     Daytime sleepiness: sometimes  Fatigue or decreased energy: sometimes   Difficulty remembering things in daytime: n  Difficulty staying focused in daytime: : n  Irritable during the day: n    Drowsy driving: n  Hx of car accident: n  Near-miss Car accident: n      RLS screen:  RLSSCREEN: - Sensations: Patient does not have unusual sensations in their extremities that cause an urge to move them     Sleep-related behaviors: DENIES    ESS: 3      REVIEW OF SYSTEMS     REVIEW OF SYSTEMS  Review of Systems   All other systems reviewed and are negative.      ALLERGIES AND MEDICATIONS     ALLERGIES  Allergies   Allergen Reactions    Latex Itching    Pollen Extracts Cough     A lot of sneezing     Tetracyclines Nausea Only and Nausea/vomiting       MEDICATIONS  Current Outpatient Medications   Medication Sig Dispense Refill    albuterol 90 mcg/actuation inhaler Inhale 2 puffs every 4 hours if needed.      atorvastatin (Lipitor) 10 mg tablet Take 1 tablet (10 mg) by mouth once daily. 90 tablet 3    b complex vitamins capsule Take 1 capsule by mouth once daily.      buPROPion XL (Wellbutrin XL) 150 mg 24 hr tablet Take 1 tablet (150 mg) by mouth once daily. 90 tablet 3    calcium carbonate 600 mg calcium (1,500 mg) tablet Take 1 tablet (1,500 mg) by mouth once daily.      cholecalciferol (Vitamin D-3) 50 MCG (2000 UT) tablet Take 1 tablet (2,000 Units) by mouth once daily.      diclofenac sodium 3 % gel Apply sparingly to the affected areas twice daily      glucosam/chond-msm1/C/nehemiah/bor (GLUCOSAMINE-CHOND-MSM COMPLEX ORAL) Take 1 tablet by mouth once daily. As directed.      hydroCHLOROthiazide (HYDRODiuril) 25 mg tablet Take 1 tablet (25 mg)  by mouth once daily. 90 tablet 3    lutein 40 mg capsule Take 1 capsule by mouth once daily.      multivitamin (Daily Multi-Vitamin) tablet Take 1 tablet by mouth once daily.      NON FORMULARY Bupivacaine HCI SOSY      PROTEIN SUPPLEMENT ORAL 80% POWD; Use as directed.      estradiol (Estrace) 0.01 % (0.1 mg/gram) vaginal cream Apply pea sized amount to vagina nightly for 1 week then every Monday and Thursday 42.5 g 5     No current facility-administered medications for this visit.         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), Dyspnea, unspecified (02/05/2015), Elevated blood-pressure reading, without diagnosis of hypertension (05/03/2017), Encounter for immunization (08/12/2021), Encounter for screening for malignant neoplasm of colon (10/03/2019), Gastro-esophageal reflux disease without esophagitis (08/12/2021), Long term (current) use of antibiotics (07/27/2016), Low back pain, unspecified (05/03/2017), Myositis, unspecified (05/03/2017), Nondisplaced fracture of greater tuberosity of left humerus, initial encounter for closed fracture (02/01/2021), Other conditions influencing health status (02/20/2019), Other general symptoms and signs (05/03/2017), Other specified anxiety disorders (04/13/2016), Pain in right shoulder (08/29/2019), Pain in unspecified joint (05/03/2017), Palpitations (05/03/2017), Personal history of other diseases of the musculoskeletal system and connective tissue (07/18/2018), Personal history of other medical treatment (07/18/2018), Snoring (05/03/2017), Strain of unspecified muscle, fascia and tendon at shoulder and upper arm level, left arm, initial encounter (08/29/2019), Unspecified cataract (04/27/2016), Unspecified cataract (04/27/2016), Unspecified cataract (04/27/2016),  "Unspecified cataract (04/27/2016), and Vitamin D deficiency, unspecified (08/03/2017).      PAST SURGICAL HISTORY:  Past Surgical History:   Procedure Laterality Date    CATARACT EXTRACTION      OTHER SURGICAL HISTORY  08/17/2022    Cataract surgery    TONSILLECTOMY  04/27/2016    Tonsillectomy    TUBAL LIGATION  04/27/2016    Tubal Ligation       FAMILY HISTORY  Family History   Problem Relation Name Age of Onset    Hypertension Mother      Other (Diabetes mellitus) Father      Glaucoma Father      Hypertension Father       DOES/DOES NOT EC: does have a family history of sleep disorder.  Father hx of sleep apnea on pap therapy.     SOCIAL HISTORY  She  reports that she has never smoked. She has never used smokeless tobacco. She reports current alcohol use. She reports that she does not use drugs.     Caffeine consumption: 2 cup coffee in am.    PHYSICAL EXAM     VITAL SIGNS: /73   Pulse 57   Temp 36.8 °C (98.3 °F)   Resp 18   Ht 1.588 m (5' 2.5\")   Wt 82.1 kg (181 lb 1.3 oz)   SpO2 96%   BMI 32.59 kg/m²      PREVIOUS WEIGHTS:  Wt Readings from Last 3 Encounters:   02/28/25 82.1 kg (181 lb 1.3 oz)   08/29/24 75.3 kg (166 lb 1.6 oz)   05/28/24 70.3 kg (154 lb 15.7 oz)       Physical Exam  Constitutional: Alert and oriented, cooperative, no obvious distress.   HENT: normocephalic.   Neurologic: AOx3.   psychiatric: appropriate mood and affect.  Integumentary: no significant rashes observed over pap mask area.     RESULTS/DATA       ASSESSMENT/PLAN     Ms. Adhikari is a 69 y.o. female and  has a past medical history of Abnormal findings on diagnostic imaging of liver and biliary tract (07/02/2018), Abnormal weight loss (05/14/2015), Congenital malformation of breast, unspecified (05/03/2017), Dyspnea, unspecified (02/05/2015), Elevated blood-pressure reading, without diagnosis of hypertension (05/03/2017), Encounter for immunization (08/12/2021), Encounter for screening for malignant neoplasm of colon " (10/03/2019), Gastro-esophageal reflux disease without esophagitis (08/12/2021), Long term (current) use of antibiotics (07/27/2016), Low back pain, unspecified (05/03/2017), Myositis, unspecified (05/03/2017), Nondisplaced fracture of greater tuberosity of left humerus, initial encounter for closed fracture (02/01/2021), Other conditions influencing health status (02/20/2019), Other general symptoms and signs (05/03/2017), Other specified anxiety disorders (04/13/2016), Pain in right shoulder (08/29/2019), Pain in unspecified joint (05/03/2017), Palpitations (05/03/2017), Personal history of other diseases of the musculoskeletal system and connective tissue (07/18/2018), Personal history of other medical treatment (07/18/2018), Snoring (05/03/2017), Strain of unspecified muscle, fascia and tendon at shoulder and upper arm level, left arm, initial encounter (08/29/2019), Unspecified cataract (04/27/2016), Unspecified cataract (04/27/2016), Unspecified cataract (04/27/2016), Unspecified cataract (04/27/2016), and Vitamin D deficiency, unspecified (08/03/2017). She is following up on sleep apnea.     Problem List Items Addressed This Visit    None    Problem List and Orders  Pmhx includes HTN, asthma.      1- LINDA  HST 4/4/24 --> mild LINDA  REI3% 7.5, REI4% 6.8, ROSE 0. SpO2 estefany 85%.     Symptoms include nocturia, snoring, night time awakenings, gasping/ choking, morning headaches.      Reviewed and discussed the above sleep study results and management options in details. All questions answered, patient verbalizes understanding.     -previously adjusted from. Autopap 4-12cwp, rAHI 7.6, median pressure 12.3, max avg 14.9, acceptable compliance, good seal, to --> Autopap 12-18cwp, rAHI 7.9, median pressure 13.5, max avg 16.1. adjusting further to --> Autopap 14-18cwp (of note patient gained weight)    You can use a chin strap to help with your dry mouth, you can also adjust your humidity level to your comfort. You can  use a nasal spray to help you breathe better from your nose.    -sometimes she feels that there is water in her tubing --> adjust humidity and temperature to comfort. --> issue resolved.     -do not drive or operate heavy machinery if drowsy.  -avoid sleeping on your back.   -avoid sedating substances/ medication, alcohol, illicit drugs and tobacco.    2- obesity   counseled on eating a healthy diet and exercising as tolerated.  Walks dogs, goes to the [facility]  Referred to endocrinology     3- HTN  Stable  Cont. Current regimen   Follow up with pcp    Follow up in 6 months or sooner as needed.

## 2025-03-25 ENCOUNTER — APPOINTMENT (OUTPATIENT)
Dept: PRIMARY CARE | Facility: CLINIC | Age: 70
End: 2025-03-25
Payer: MEDICARE

## 2025-03-25 VITALS
RESPIRATION RATE: 18 BRPM | BODY MASS INDEX: 33.45 KG/M2 | DIASTOLIC BLOOD PRESSURE: 82 MMHG | HEART RATE: 72 BPM | WEIGHT: 181.8 LBS | SYSTOLIC BLOOD PRESSURE: 128 MMHG | HEIGHT: 62 IN | OXYGEN SATURATION: 98 % | TEMPERATURE: 96.7 F

## 2025-03-25 DIAGNOSIS — E66.09 CLASS 1 OBESITY DUE TO EXCESS CALORIES WITH SERIOUS COMORBIDITY AND BODY MASS INDEX (BMI) OF 33.0 TO 33.9 IN ADULT: ICD-10-CM

## 2025-03-25 DIAGNOSIS — Z00.00 ANNUAL PHYSICAL EXAM: Primary | ICD-10-CM

## 2025-03-25 DIAGNOSIS — H35.371 EPIRETINAL MEMBRANE (ERM) OF RIGHT EYE: ICD-10-CM

## 2025-03-25 DIAGNOSIS — R35.1 NOCTURIA: ICD-10-CM

## 2025-03-25 DIAGNOSIS — G89.29 CHRONIC PAIN OF RIGHT KNEE: ICD-10-CM

## 2025-03-25 DIAGNOSIS — H04.123 DRY EYE SYNDROME OF BOTH EYES: ICD-10-CM

## 2025-03-25 DIAGNOSIS — M25.561 CHRONIC PAIN OF RIGHT KNEE: ICD-10-CM

## 2025-03-25 DIAGNOSIS — H90.3 SENSORINEURAL HEARING LOSS (SNHL) OF BOTH EARS: ICD-10-CM

## 2025-03-25 DIAGNOSIS — Z78.9 VEGETARIAN DIET: ICD-10-CM

## 2025-03-25 DIAGNOSIS — G47.33 OSA ON CPAP: ICD-10-CM

## 2025-03-25 DIAGNOSIS — F33.42 RECURRENT MAJOR DEPRESSIVE DISORDER, IN FULL REMISSION: ICD-10-CM

## 2025-03-25 DIAGNOSIS — J45.20 MILD INTERMITTENT ASTHMA WITHOUT COMPLICATION (HHS-HCC): ICD-10-CM

## 2025-03-25 DIAGNOSIS — H25.812 COMBINED FORMS OF AGE-RELATED CATARACT OF LEFT EYE: ICD-10-CM

## 2025-03-25 DIAGNOSIS — Z12.31 ENCOUNTER FOR SCREENING MAMMOGRAM FOR MALIGNANT NEOPLASM OF BREAST: ICD-10-CM

## 2025-03-25 DIAGNOSIS — Z98.41 HISTORY OF RIGHT CATARACT EXTRACTION: ICD-10-CM

## 2025-03-25 DIAGNOSIS — E78.2 MIXED HYPERLIPIDEMIA: ICD-10-CM

## 2025-03-25 DIAGNOSIS — L71.9 ROSACEA, UNSPECIFIED: ICD-10-CM

## 2025-03-25 DIAGNOSIS — R09.82 POST-NASAL DRIP: ICD-10-CM

## 2025-03-25 DIAGNOSIS — I10 PRIMARY HYPERTENSION: ICD-10-CM

## 2025-03-25 DIAGNOSIS — E66.811 CLASS 1 OBESITY DUE TO EXCESS CALORIES WITH SERIOUS COMORBIDITY AND BODY MASS INDEX (BMI) OF 33.0 TO 33.9 IN ADULT: ICD-10-CM

## 2025-03-25 PROBLEM — L73.8 OTHER SPECIFIED FOLLICULAR DISORDERS: Status: RESOLVED | Noted: 2023-07-12 | Resolved: 2025-03-25

## 2025-03-25 PROBLEM — H01.009 BLEPHARITIS WITH ROSACEA: Status: RESOLVED | Noted: 2023-10-21 | Resolved: 2025-03-25

## 2025-03-25 PROBLEM — D48.5 NEOPLASM OF UNCERTAIN BEHAVIOR OF SKIN: Status: RESOLVED | Noted: 2023-07-12 | Resolved: 2025-03-25

## 2025-03-25 PROBLEM — Z86.39 HISTORY OF OBESITY: Status: RESOLVED | Noted: 2023-10-21 | Resolved: 2025-03-25

## 2025-03-25 PROBLEM — D22.39 MELANOCYTIC NEVI OF OTHER PARTS OF FACE: Status: RESOLVED | Noted: 2023-07-12 | Resolved: 2025-03-25

## 2025-03-25 PROBLEM — H52.00 HYPEROPIA: Status: RESOLVED | Noted: 2023-10-21 | Resolved: 2025-03-25

## 2025-03-25 PROBLEM — M65.332 TRIGGER MIDDLE FINGER OF LEFT HAND: Status: RESOLVED | Noted: 2023-10-21 | Resolved: 2025-03-25

## 2025-03-25 PROBLEM — L82.0 INFLAMED SEBORRHEIC KERATOSIS: Status: RESOLVED | Noted: 2023-07-12 | Resolved: 2025-03-25

## 2025-03-25 PROBLEM — S49.90XA SHOULDER INJURY, INITIAL ENCOUNTER: Status: RESOLVED | Noted: 2023-10-21 | Resolved: 2025-03-25

## 2025-03-25 PROBLEM — D18.03 LIVER HEMANGIOMA: Status: RESOLVED | Noted: 2023-10-21 | Resolved: 2025-03-25

## 2025-03-25 PROBLEM — D22.5 MELANOCYTIC NEVI OF TRUNK: Status: RESOLVED | Noted: 2023-07-12 | Resolved: 2025-03-25

## 2025-03-25 PROBLEM — E83.52 HYPERCALCEMIA: Status: RESOLVED | Noted: 2023-10-21 | Resolved: 2025-03-25

## 2025-03-25 PROBLEM — Z85.828 PERSONAL HISTORY OF OTHER MALIGNANT NEOPLASM OF SKIN: Status: RESOLVED | Noted: 2023-07-12 | Resolved: 2025-03-25

## 2025-03-25 PROBLEM — L57.0 ACTINIC KERATOSIS: Status: RESOLVED | Noted: 2023-07-12 | Resolved: 2025-03-25

## 2025-03-25 PROBLEM — L57.9 SKIN CHANGES DUE TO CHRONIC EXPOSURE TO NONIONIZING RADIATION, UNSPECIFIED: Status: RESOLVED | Noted: 2023-07-12 | Resolved: 2025-03-25

## 2025-03-25 PROBLEM — D22.60 MELANOCYTIC NEVI OF UNSPECIFIED UPPER LIMB, INCLUDING SHOULDER: Status: RESOLVED | Noted: 2023-07-12 | Resolved: 2025-03-25

## 2025-03-25 PROBLEM — L81.4 OTHER MELANIN HYPERPIGMENTATION: Status: RESOLVED | Noted: 2023-07-12 | Resolved: 2025-03-25

## 2025-03-25 PROBLEM — L82.1 OTHER SEBORRHEIC KERATOSIS: Status: RESOLVED | Noted: 2023-07-12 | Resolved: 2025-03-25

## 2025-03-25 PROBLEM — D22.70 MELANOCYTIC NEVI OF UNSPECIFIED LOWER LIMB, INCLUDING HIP: Status: RESOLVED | Noted: 2023-07-12 | Resolved: 2025-03-25

## 2025-03-25 PROBLEM — S42.253A: Status: RESOLVED | Noted: 2023-10-21 | Resolved: 2025-03-25

## 2025-03-25 PROBLEM — L90.5 SCAR CONDITION AND FIBROSIS OF SKIN: Status: RESOLVED | Noted: 2023-07-12 | Resolved: 2025-03-25

## 2025-03-25 PROBLEM — Z96.1 PSEUDOPHAKIA OF BOTH EYES: Status: RESOLVED | Noted: 2023-10-21 | Resolved: 2025-03-25

## 2025-03-25 PROBLEM — D18.01 HEMANGIOMA OF SKIN AND SUBCUTANEOUS TISSUE: Status: RESOLVED | Noted: 2023-07-12 | Resolved: 2025-03-25

## 2025-03-25 PROBLEM — N28.1 SIMPLE RENAL CYST: Status: RESOLVED | Noted: 2023-10-21 | Resolved: 2025-03-25

## 2025-03-25 PROBLEM — R63.5 WEIGHT GAIN: Status: RESOLVED | Noted: 2023-10-21 | Resolved: 2025-03-25

## 2025-03-25 PROBLEM — H26.491 PCO (POSTERIOR CAPSULAR OPACIFICATION), RIGHT: Status: RESOLVED | Noted: 2023-10-21 | Resolved: 2025-03-25

## 2025-03-25 PROBLEM — L71.8 OTHER ROSACEA: Status: RESOLVED | Noted: 2023-07-12 | Resolved: 2025-03-25

## 2025-03-25 PROBLEM — M25.569 KNEE PAIN: Status: RESOLVED | Noted: 2023-10-21 | Resolved: 2025-03-25

## 2025-03-25 PROCEDURE — 99213 OFFICE O/P EST LOW 20 MIN: CPT

## 2025-03-25 PROCEDURE — G0439 PPPS, SUBSEQ VISIT: HCPCS

## 2025-03-25 PROCEDURE — 1159F MED LIST DOCD IN RCRD: CPT

## 2025-03-25 PROCEDURE — 1036F TOBACCO NON-USER: CPT

## 2025-03-25 PROCEDURE — 3074F SYST BP LT 130 MM HG: CPT

## 2025-03-25 PROCEDURE — 3079F DIAST BP 80-89 MM HG: CPT

## 2025-03-25 PROCEDURE — G0442 ANNUAL ALCOHOL SCREEN 15 MIN: HCPCS

## 2025-03-25 PROCEDURE — 3008F BODY MASS INDEX DOCD: CPT

## 2025-03-25 PROCEDURE — G0444 DEPRESSION SCREEN ANNUAL: HCPCS

## 2025-03-25 PROCEDURE — 1170F FXNL STATUS ASSESSED: CPT

## 2025-03-25 PROCEDURE — 99497 ADVNCD CARE PLAN 30 MIN: CPT

## 2025-03-25 RX ORDER — IPRATROPIUM BROMIDE 42 UG/1
2 SPRAY, METERED NASAL 4 TIMES DAILY PRN
Qty: 15 ML | Refills: 0 | Status: SHIPPED | OUTPATIENT
Start: 2025-03-25 | End: 2025-03-29

## 2025-03-25 RX ORDER — CETIRIZINE HYDROCHLORIDE 10 MG/1
10 TABLET ORAL DAILY
Qty: 30 TABLET | Refills: 1 | Status: SHIPPED | OUTPATIENT
Start: 2025-03-25 | End: 2025-05-24

## 2025-03-25 ASSESSMENT — ACTIVITIES OF DAILY LIVING (ADL)
DRESSING: INDEPENDENT
DOING_HOUSEWORK: INDEPENDENT
TAKING_MEDICATION: INDEPENDENT
GROCERY_SHOPPING: INDEPENDENT
BATHING: INDEPENDENT
MANAGING_FINANCES: INDEPENDENT

## 2025-03-25 ASSESSMENT — PATIENT HEALTH QUESTIONNAIRE - PHQ9
SUM OF ALL RESPONSES TO PHQ9 QUESTIONS 1 AND 2: 0
1. LITTLE INTEREST OR PLEASURE IN DOING THINGS: NOT AT ALL
2. FEELING DOWN, DEPRESSED OR HOPELESS: NOT AT ALL

## 2025-03-25 NOTE — PROGRESS NOTES
Subjective   Codie Adhikari is a 70 y.o. female     MEDICARE ANNUAL WELLNESS VISIT    Concerns:  - Patient reports postnasal drip/drainage.  It happens year-round.  She notices it more when she is outside.  She denies significant allergies.  She tried Flonase nasal spray and it did not really help.  She denies nasal congestion, rhinorrhea, fever, chills, cough, sore throat.    - Patient reports chronic right knee pain.  She thinks that eventually she will need a right total knee replacement due to knee osteoarthritis.  She is not following with orthopedics.  She has never had a cortisone injection.    - She has bilateral hearing loss.  She has tried buying one of the cheaper over-the-counter amplifiers however she does not like to wear it because it amplifies all sound including background noise.  She thinks it is time to look into a hearing aid finally.    Specialists that pt follows with: sleep med, derm, ophthalmologist     Preventative:  - Immunizations: UTD on influenza, covid x9, Tdap (2024), shingrix, prevnar, RSV  - Mammograms: UTD, normal 4/2024, repeat due 4/2025  - Colorectal cancer screening: UTD, colonoscopy 4/2023, repeat due 4/2028  - DEXA scan: UTD, osteopenia 4/2024, repeat duer 4/2026  - Chlamydia/Gonorrhea testing: Not interested   - 1 time Hep C testing: done  - 1 time HIV testing: done  - Dental care: UTD  - Vision care: UTD  - Pt had 2 recent falls. Slipped on ice when walking dogs. 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   - Diet: Well balanced, vegetarian   - Exercise: Infrequently  - Alcohol/tobacco/drugs: Occasional social alcohol use, no tobacco/nicotine/drugs  - Mood: Good on wellbutrin     Active Problem List      Comprehensive Medical/Surgical/Social/Family History  Past Medical History:   Diagnosis Date    Abnormal findings on diagnostic imaging of liver and biliary tract  07/02/2018    Abnormal CT scan, liver    Abnormal weight loss 05/14/2015    Weight loss    Congenital malformation of breast, unspecified 05/03/2017    Breast anomaly    Dyspnea, unspecified 02/05/2015    Dyspnea    Elevated blood-pressure reading, without diagnosis of hypertension 05/03/2017    Borderline hypertension    Encounter for immunization 08/12/2021    Encounter for immunization    Encounter for screening for malignant neoplasm of colon 10/03/2019    Screen for colon cancer    Gastro-esophageal reflux disease without esophagitis 08/12/2021    GERD (gastroesophageal reflux disease)    Long term (current) use of antibiotics 07/27/2016    Prophylactic antibiotic    Low back pain, unspecified 05/03/2017    Low back pain    Myositis, unspecified 05/03/2017    Myofasciitis    Nondisplaced fracture of greater tuberosity of left humerus, initial encounter for closed fracture 02/01/2021    Closed nondisplaced fracture of greater tuberosity of left humerus, initial encounter    Other conditions influencing health status 02/20/2019    Intentional weight loss    Other general symptoms and signs 05/03/2017    Heat intolerance    Other specified anxiety disorders 04/13/2016    Depression with anxiety    Pain in right shoulder 08/29/2019    Shoulder pain, right    Pain in unspecified joint 05/03/2017    Joint pain    Palpitations 05/03/2017    Palpitations    Personal history of other diseases of the musculoskeletal system and connective tissue 07/18/2018    History of back pain    Personal history of other medical treatment 07/18/2018    History of screening mammography    Snoring 05/03/2017    Snoring    Strain of unspecified muscle, fascia and tendon at shoulder and upper arm level, left arm, initial encounter 08/29/2019    Strain of elbow, left    Unspecified cataract 04/27/2016    Cataract of right eye    Unspecified cataract 04/27/2016    Cataract of left eye    Unspecified cataract 04/27/2016    Cataract of right  eye    Unspecified cataract 04/27/2016    Cataract of left eye    Vitamin D deficiency, unspecified 08/03/2017    Vitamin D deficiency     Past Surgical History:   Procedure Laterality Date    CATARACT EXTRACTION      OTHER SURGICAL HISTORY  08/17/2022    Cataract surgery    TONSILLECTOMY  04/27/2016    Tonsillectomy    TUBAL LIGATION  04/27/2016    Tubal Ligation     Social History     Social History Narrative    Not on file       Allergies and Medications  Latex, Pollen extracts, and Tetracyclines  Current Outpatient Medications on File Prior to Visit   Medication Sig Dispense Refill    albuterol 90 mcg/actuation inhaler Inhale 2 puffs every 4 hours if needed.      atorvastatin (Lipitor) 10 mg tablet Take 1 tablet (10 mg) by mouth once daily. 90 tablet 3    b complex vitamins capsule Take 1 capsule by mouth once daily.      buPROPion XL (Wellbutrin XL) 150 mg 24 hr tablet Take 1 tablet (150 mg) by mouth once daily. 90 tablet 3    calcium carbonate 600 mg calcium (1,500 mg) tablet Take 1 tablet (1,500 mg) by mouth once daily.      cholecalciferol (Vitamin D-3) 50 MCG (2000 UT) tablet Take 1 tablet (2,000 Units) by mouth once daily.      diclofenac sodium 3 % gel Apply sparingly to the affected areas twice daily      estradiol (Estrace) 0.01 % (0.1 mg/gram) vaginal cream Apply pea sized amount to vagina nightly for 1 week then every Monday and Thursday 42.5 g 5    hydroCHLOROthiazide (HYDRODiuril) 25 mg tablet Take 1 tablet (25 mg) by mouth once daily. 90 tablet 3    lutein 40 mg capsule Take 1 capsule by mouth once daily.      multivitamin (Daily Multi-Vitamin) tablet Take 1 tablet by mouth once daily.      PROTEIN SUPPLEMENT ORAL 80% POWD; Use as directed.      NON FORMULARY Bupivacaine HCI SOSY      [DISCONTINUED] glucosam/chond-msm1/C/nehemiah/bor (GLUCOSAMINE-CHOND-MSM COMPLEX ORAL) Take 1 tablet by mouth once daily. As directed. (Patient not taking: Reported on 3/25/2025)       No current facility-administered  "medications on file prior to visit.       Objective   /82   Pulse 72   Temp 35.9 °C (96.7 °F)   Resp 18   Ht 1.575 m (5' 2\")   Wt 82.5 kg (181 lb 12.8 oz)   SpO2 98%   BMI 33.25 kg/m²    PHYSICAL EXAM  Gen: Well appearing, in NAD  Eyes: EOMI  HEENT: MMM. TMs normal. Throat normal.  Heart: RRR, no murmurs  Lungs: No increased work of breathing, CTAB, on RA  GI: Soft, NTND, no guarding or rebound  Extremities: WWP, cap refill <2sec, no pitting edema in LE b/l  Neuro: Alert, symmetrical facies, moves all extremities equally  Skin: No rashes or lesions  Psych: Appropriate mood and affect    Assessment/Plan   - Reviewed Social Determinants of health with patient. Discussed healthy lifestyle, including 150 minutes of physical activity per week.  - Ordered/Reviewed baseline labwork   - Immunizations up to date  - Follow up in 1 year for next annual physical or sooner for acute concerns    Problem List Items Addressed This Visit       Asthma    Overview     Well-controlled with as needed albuterol inhaler         Rosacea, unspecified    Overview     Follows with dermatology         RESOLVED: Combined forms of age-related cataract of left eye    Dry eye syndrome    Overview     Follows with ophthalmology         Epiretinal membrane (ERM) of right eye    Overview     Follows with ophthalmology         Primary hypertension    Overview     Well-controlled on hydrochlorothiazide 25 mg daily         History of right cataract extraction    Overview     Follows with ophthalmology         Hyperlipidemia    Overview     On atorvastatin 10 mg daily         Chronic pain of right knee    Current Assessment & Plan     Will refer to orthopedics for possible right knee cortisone injection.  She also wants to discuss the possibility of getting her knee replaced in the future         Relevant Orders    Referral to Orthopedics and Sports Medicine    Nocturia    Overview     Well-controlled on estradiol vaginal cream         " Vegetarian diet    Recurrent major depressive disorder, in full remission (CMS-HCC)    Overview     Well-controlled on Wellbutrin 150 mg daily         Sensorineural hearing loss (SNHL) of both ears    Current Assessment & Plan     Will place new audiology referral.  Patient is now interested in getting hearing aids which she knows that she needs         Relevant Orders    Referral to Audiology    LINDA on CPAP    Overview     Follows with sleep medicine specialist         Post-nasal drip    Current Assessment & Plan     Will trial Atrovent nasal spray and Zyrtec.  If these are not helping well enough after a couple of weeks, will refer to ENT         Relevant Medications    ipratropium (Atrovent) 42 mcg (0.06 %) nasal spray    cetirizine (ZyrTEC) 10 mg tablet     Other Visit Diagnoses       Annual physical exam    -  Primary    Encounter for screening mammogram for malignant neoplasm of breast        Relevant Orders    BI mammo bilateral screening tomosynthesis    Class 1 obesity due to excess calories with serious comorbidity and body mass index (BMI) of 33.0 to 33.9 in adult        Relevant Orders    CBC    Comprehensive Metabolic Panel    Lipid Panel    TSH with reflex to Free T4 if abnormal    Vitamin D 25-Hydroxy,Total (for eval of Vitamin D levels)    Vitamin B12    Hemoglobin A1C          I spent 15 minutes obtaining and discussing depression screening.     I spent up to 15 minutes obtaining and discussing alcohol use screening.     I spent greater than 16 minutes discussing advanced care planning documentation, including the explanation and discussion of advanced directives.     Lydia Bliss D.O.  Family Medicine Physician  [facility]  [address] [place] [address] [phone]    This note has been transcribed using Dragon voice recognition system and there is a possibility of unintentional typing misprints.

## 2025-03-25 NOTE — ASSESSMENT & PLAN NOTE
Will trial Atrovent nasal spray and Zyrtec.  If these are not helping well enough after a couple of weeks, will refer to ENT

## 2025-03-25 NOTE — ASSESSMENT & PLAN NOTE
Will place new audiology referral.  Patient is now interested in getting hearing aids which she knows that she needs

## 2025-03-25 NOTE — ASSESSMENT & PLAN NOTE
Will refer to orthopedics for possible right knee cortisone injection.  She also wants to discuss the possibility of getting her knee replaced in the future

## 2025-03-27 ENCOUNTER — APPOINTMENT (OUTPATIENT)
Dept: PRIMARY CARE | Facility: CLINIC | Age: 70
End: 2025-03-27
Payer: MEDICARE

## 2025-04-14 ENCOUNTER — APPOINTMENT (OUTPATIENT)
Dept: OPHTHALMOLOGY | Facility: CLINIC | Age: 70
End: 2025-04-14
Payer: MEDICARE

## 2025-04-14 DIAGNOSIS — Z96.1 PSEUDOPHAKIA OF BOTH EYES: ICD-10-CM

## 2025-04-14 DIAGNOSIS — H04.123 DRY EYE SYNDROME OF BOTH EYES: Primary | ICD-10-CM

## 2025-04-14 DIAGNOSIS — H35.371 EPIRETINAL MEMBRANE (ERM) OF RIGHT EYE: ICD-10-CM

## 2025-04-14 DIAGNOSIS — H26.492 PCO (POSTERIOR CAPSULAR OPACIFICATION), LEFT: ICD-10-CM

## 2025-04-14 PROCEDURE — G2211 COMPLEX E/M VISIT ADD ON: HCPCS | Performed by: OPHTHALMOLOGY

## 2025-04-14 PROCEDURE — 99213 OFFICE O/P EST LOW 20 MIN: CPT | Performed by: OPHTHALMOLOGY

## 2025-04-14 ASSESSMENT — CONF VISUAL FIELD
OD_INFERIOR_TEMPORAL_RESTRICTION: 0
OS_INFERIOR_NASAL_RESTRICTION: 0
OD_NORMAL: 1
OD_SUPERIOR_TEMPORAL_RESTRICTION: 0
OD_SUPERIOR_NASAL_RESTRICTION: 0
OD_INFERIOR_NASAL_RESTRICTION: 0
OS_SUPERIOR_NASAL_RESTRICTION: 0
OS_INFERIOR_TEMPORAL_RESTRICTION: 0
OS_NORMAL: 1
OS_SUPERIOR_TEMPORAL_RESTRICTION: 0

## 2025-04-14 ASSESSMENT — VISUAL ACUITY
OS_CC: 20/20-1
METHOD: SNELLEN - LINEAR
CORRECTION_TYPE: GLASSES
OD_CC: 20/20

## 2025-04-14 ASSESSMENT — EXTERNAL EXAM - LEFT EYE: OS_EXAM: NORMAL

## 2025-04-14 ASSESSMENT — SLIT LAMP EXAM - LIDS
COMMENTS: NORMAL
COMMENTS: NORMAL

## 2025-04-14 ASSESSMENT — TONOMETRY
OS_IOP_MMHG: 12
OD_IOP_MMHG: 13
IOP_METHOD: TONOPEN

## 2025-04-14 ASSESSMENT — REFRACTION_WEARINGRX
OD_CYLINDER: -0.25
OS_AXIS: 155
OS_ADD: +2.50
OD_SPHERE: -0.50
OS_SPHERE: PLANO
OS_CYLINDER: -0.50
OD_AXIS: 030
OD_ADD: +2.50
SPECS_TYPE: PAL

## 2025-04-14 ASSESSMENT — CUP TO DISC RATIO
OS_RATIO: .3
OD_RATIO: .3

## 2025-04-14 ASSESSMENT — ENCOUNTER SYMPTOMS: EYES NEGATIVE: 1

## 2025-04-14 ASSESSMENT — EXTERNAL EXAM - RIGHT EYE: OD_EXAM: NORMAL

## 2025-04-14 NOTE — PROGRESS NOTES
Assessment/Plan   Diagnoses and all orders for this visit:  Dry eye syndrome of both eyes  ATs    Epiretinal membrane (ERM) of right eye  Monitored by Dr. Ly    Pseudophakia of both eyes  PCO, left  Mild PCO OS  Monitor    1 year for DFE

## 2025-04-15 LAB
25(OH)D3+25(OH)D2 SERPL-MCNC: 69 NG/ML (ref 30–100)
ALBUMIN SERPL-MCNC: 4.4 G/DL (ref 3.6–5.1)
ALP SERPL-CCNC: 68 U/L (ref 37–153)
ALT SERPL-CCNC: 19 U/L (ref 6–29)
ANION GAP SERPL CALCULATED.4IONS-SCNC: 8 MMOL/L (CALC) (ref 7–17)
AST SERPL-CCNC: 17 U/L (ref 10–35)
BILIRUB SERPL-MCNC: 0.5 MG/DL (ref 0.2–1.2)
BUN SERPL-MCNC: 15 MG/DL (ref 7–25)
CALCIUM SERPL-MCNC: 9.3 MG/DL (ref 8.6–10.4)
CHLORIDE SERPL-SCNC: 106 MMOL/L (ref 98–110)
CHOLEST SERPL-MCNC: 141 MG/DL
CHOLEST/HDLC SERPL: 3.7 (CALC)
CO2 SERPL-SCNC: 28 MMOL/L (ref 20–32)
CREAT SERPL-MCNC: 0.9 MG/DL (ref 0.6–1)
EGFRCR SERPLBLD CKD-EPI 2021: 68 ML/MIN/1.73M2
ERYTHROCYTE [DISTWIDTH] IN BLOOD BY AUTOMATED COUNT: 13 % (ref 11–15)
GLUCOSE SERPL-MCNC: 92 MG/DL (ref 65–99)
HCT VFR BLD AUTO: 43.2 % (ref 35–45)
HDLC SERPL-MCNC: 38 MG/DL
HGB BLD-MCNC: 14.8 G/DL (ref 11.7–15.5)
LDLC SERPL CALC-MCNC: 74 MG/DL (CALC)
MCH RBC QN AUTO: 31 PG (ref 27–33)
MCHC RBC AUTO-ENTMCNC: 34.3 G/DL (ref 32–36)
MCV RBC AUTO: 90.6 FL (ref 80–100)
NONHDLC SERPL-MCNC: 103 MG/DL (CALC)
PLATELET # BLD AUTO: 215 THOUSAND/UL (ref 140–400)
PMV BLD REES-ECKER: 10 FL (ref 7.5–12.5)
POTASSIUM SERPL-SCNC: 4.3 MMOL/L (ref 3.5–5.3)
PROT SERPL-MCNC: 6.6 G/DL (ref 6.1–8.1)
RBC # BLD AUTO: 4.77 MILLION/UL (ref 3.8–5.1)
SODIUM SERPL-SCNC: 142 MMOL/L (ref 135–146)
TRIGL SERPL-MCNC: 192 MG/DL
TSH SERPL-ACNC: 2.32 MIU/L (ref 0.4–4.5)
WBC # BLD AUTO: 7.2 THOUSAND/UL (ref 3.8–10.8)

## 2025-04-24 ENCOUNTER — OFFICE VISIT (OUTPATIENT)
Dept: URGENT CARE | Age: 70
End: 2025-04-24
Payer: MEDICARE

## 2025-04-24 VITALS
TEMPERATURE: 98.2 F | OXYGEN SATURATION: 99 % | DIASTOLIC BLOOD PRESSURE: 101 MMHG | HEART RATE: 60 BPM | RESPIRATION RATE: 18 BRPM | WEIGHT: 179 LBS | HEIGHT: 62 IN | SYSTOLIC BLOOD PRESSURE: 164 MMHG | BODY MASS INDEX: 32.94 KG/M2

## 2025-04-24 DIAGNOSIS — S61.432A PUNCTURE WOUND OF LEFT HAND WITHOUT FOREIGN BODY, INITIAL ENCOUNTER: Primary | ICD-10-CM

## 2025-04-24 RX ORDER — CEPHALEXIN 500 MG/1
500 CAPSULE ORAL 2 TIMES DAILY
Qty: 14 CAPSULE | Refills: 0 | Status: SHIPPED | OUTPATIENT
Start: 2025-04-24 | End: 2025-05-01

## 2025-04-24 RX ORDER — MUPIROCIN 20 MG/G
OINTMENT TOPICAL 3 TIMES DAILY
Qty: 22 G | Refills: 0 | Status: SHIPPED | OUTPATIENT
Start: 2025-04-24 | End: 2025-05-04

## 2025-04-24 ASSESSMENT — ENCOUNTER SYMPTOMS
DEPRESSION: 0
OCCASIONAL FEELINGS OF UNSTEADINESS: 0
LOSS OF SENSATION IN FEET: 0

## 2025-04-24 ASSESSMENT — PAIN SCALES - GENERAL: PAINLEVEL_OUTOF10: 3

## 2025-04-24 NOTE — PROGRESS NOTES
"Subjective   Patient ID: Codie Adhikari is a 70 y.o. female. They present today with a chief complaint of Puncture Wound (Left hand puncture wound today/screwdriver).    History of Present Illness  Pt presents to the  with the c/o puncture wound to left hand with screw drive. Tetanus is UTD, Tdap was 3/22/2024. Injury occurred today. Puncture wound between the thumb and pointer finger of the left hand. Some swelling; normal sensation and is able to move fingers with no issues.           Past Medical History  Allergies as of 04/24/2025 - Reviewed 04/24/2025   Allergen Reaction Noted    Latex Itching 10/21/2023    Pollen extracts Cough 04/29/2024    Tetracyclines Nausea Only and Nausea/vomiting 11/14/2019       Prescriptions Prior to Admission[1]     Medical History[2]    Surgical History[3]     reports that she has never smoked. She has never been exposed to tobacco smoke. She has never used smokeless tobacco. She reports current alcohol use. She reports that she does not use drugs.    Review of Systems  Review of Systems  10 point ROS completed and all are negative other than what is stated in the current HPI                               Objective    Vitals:    04/24/25 1557 04/24/25 1602   BP: (!) 175/95 (!) 164/101   BP Location: Right arm Left arm   Patient Position: Sitting Sitting   BP Cuff Size: Adult Adult   Pulse: 60    Resp: 18    Temp: 36.8 °C (98.2 °F)    TempSrc: Oral    SpO2: 99%    Weight: 81.2 kg (179 lb)    Height: 1.575 m (5' 2\")      No LMP recorded. Patient is postmenopausal.    Physical Exam  Vitals and nursing note reviewed.   Constitutional:       Appearance: Normal appearance.   Cardiovascular:      Rate and Rhythm: Normal rate and regular rhythm.   Pulmonary:      Effort: Pulmonary effort is normal.      Breath sounds: Normal breath sounds.   Musculoskeletal:         General: Swelling, tenderness and signs of injury present.      Comments: Single 1 cm subcutaneous puncture wound between " the 1st interdigital web space of the left hand; bleeding controlled; normal sensation; able to moved the thumb and 1st finger; cap refill less than 2 sec; pt denies numbness or tingling   Skin:     General: Skin is warm and dry.      Capillary Refill: Capillary refill takes less than 2 seconds.   Neurological:      Mental Status: She is alert and oriented to person, place, and time.   Psychiatric:         Behavior: Behavior normal.         Laceration Repair    Date/Time: 4/24/2025 4:32 PM    Performed by: CHRISTINE Christensen  Authorized by: CHRISTINE Christensen    Consent:     Consent obtained:  Verbal    Consent given by:  Patient    Risks discussed:  Infection, pain, tendon damage, nerve damage, poor wound healing, vascular damage, poor cosmetic result and need for additional repair  Universal protocol:     Patient identity confirmed:  Verbally with patient  Anesthesia:     Anesthesia method:  Local infiltration    Local anesthetic:  Lidocaine 1% w/o epi  Laceration details:     Location:  Hand    Hand location: 1st intradigital space left hand.    Length (cm):  2  Pre-procedure details:     Preparation:  Patient was prepped and draped in usual sterile fashion  Treatment:     Area cleansed with:  Saline and chlorhexidine    Amount of cleaning:  Standard    Irrigation solution:  Sterile saline    Irrigation method:  Syringe    Foreign body removal: No FB noted.    Skin repair:     Repair method:  Sutures    Suture size:  4-0    Suture material:  Nylon    Suture technique:  Simple interrupted    Number of sutures:  1  Approximation:     Approximation:  Close  Post-procedure details:     Dressing:  Antibiotic ointment and bulky dressing    Procedure completion:  Tolerated      Point of Care Test & Imaging Results from this visit  No results found for this visit on 04/24/25.   Imaging  No results found.    Cardiology, Vascular, and Other Imaging  No other imaging results found for the past 2  days      Diagnostic study results (if any) were reviewed by CHRISTINE Christensen.    Assessment/Plan   Allergies, medications, history, and pertinent labs/EKGs/Imaging reviewed by CHRISTINE Christensen.     Medical Decision Making  Puncture Wound 1st Interdigital Web Space:   - Tetanus up to date  - 1 suture placed  - Discussed wound care  - Advised on s/s to seek emergent care for  - Topical antibiotic and oral antibiotic as instructed    Orders and Diagnoses  There are no diagnoses linked to this encounter.    Medical Admin Record      Patient disposition: Home    Electronically signed by CHRISTINE Christensen  4:12 PM           [1] (Not in a hospital admission)  [2]   Past Medical History:  Diagnosis Date    Abnormal findings on diagnostic imaging of liver and biliary tract 07/02/2018    Abnormal CT scan, liver    Abnormal weight loss 05/14/2015    Weight loss    Congenital malformation of breast, unspecified 05/03/2017    Breast anomaly    Dyspnea, unspecified 02/05/2015    Dyspnea    Elevated blood-pressure reading, without diagnosis of hypertension 05/03/2017    Borderline hypertension    Encounter for immunization 08/12/2021    Encounter for immunization    Encounter for screening for malignant neoplasm of colon 10/03/2019    Screen for colon cancer    Gastro-esophageal reflux disease without esophagitis 08/12/2021    GERD (gastroesophageal reflux disease)    Long term (current) use of antibiotics 07/27/2016    Prophylactic antibiotic    Low back pain, unspecified 05/03/2017    Low back pain    Myositis, unspecified 05/03/2017    Myofasciitis    Nondisplaced fracture of greater tuberosity of left humerus, initial encounter for closed fracture 02/01/2021    Closed nondisplaced fracture of greater tuberosity of left humerus, initial encounter    Other conditions influencing health status 02/20/2019    Intentional weight loss    Other general symptoms and signs 05/03/2017    Heat  intolerance    Other specified anxiety disorders 04/13/2016    Depression with anxiety    Pain in right shoulder 08/29/2019    Shoulder pain, right    Pain in unspecified joint 05/03/2017    Joint pain    Palpitations 05/03/2017    Palpitations    Personal history of other diseases of the musculoskeletal system and connective tissue 07/18/2018    History of back pain    Personal history of other medical treatment 07/18/2018    History of screening mammography    Snoring 05/03/2017    Snoring    Strain of unspecified muscle, fascia and tendon at shoulder and upper arm level, left arm, initial encounter 08/29/2019    Strain of elbow, left    Unspecified cataract 04/27/2016    Cataract of right eye    Unspecified cataract 04/27/2016    Cataract of left eye    Unspecified cataract 04/27/2016    Cataract of right eye    Unspecified cataract 04/27/2016    Cataract of left eye    Vitamin D deficiency, unspecified 08/03/2017    Vitamin D deficiency   [3]   Past Surgical History:  Procedure Laterality Date    CATARACT EXTRACTION      OTHER SURGICAL HISTORY  08/17/2022    Cataract surgery    TONSILLECTOMY  04/27/2016    Tonsillectomy    TUBAL LIGATION  04/27/2016    Tubal Ligation

## 2025-04-24 NOTE — PATIENT INSTRUCTIONS
Puncture Wound 1st Interdigital Web Space:   - Tetanus up to date  - 1 suture placed  - Discussed wound care  - Advised on s/s to seek emergent care for  - Topical antibiotic and oral antibiotic as instructed

## 2025-04-28 ENCOUNTER — HOSPITAL ENCOUNTER (OUTPATIENT)
Dept: RADIOLOGY | Facility: CLINIC | Age: 70
Discharge: HOME | End: 2025-04-28
Payer: MEDICARE

## 2025-04-28 VITALS — HEIGHT: 62 IN | WEIGHT: 179 LBS | BODY MASS INDEX: 32.94 KG/M2

## 2025-04-28 DIAGNOSIS — Z12.31 ENCOUNTER FOR SCREENING MAMMOGRAM FOR MALIGNANT NEOPLASM OF BREAST: ICD-10-CM

## 2025-04-28 PROCEDURE — 77063 BREAST TOMOSYNTHESIS BI: CPT | Performed by: RADIOLOGY

## 2025-04-28 PROCEDURE — 77067 SCR MAMMO BI INCL CAD: CPT | Performed by: RADIOLOGY

## 2025-04-28 PROCEDURE — 77067 SCR MAMMO BI INCL CAD: CPT

## 2025-04-29 NOTE — PROGRESS NOTES
AUDIOLOGIC EVALUATION  Name: Codie Adhikari  YOB: 1955  MRN: [id number]  Age: 70 y.o.    Date of Evaluation:  4/30/2025     History:  Codie Adhikari, 70 y.o., was seen today for a hearing evaluation on order from Lydia Bliss DO.The patient reported hearing loss in both ears that has become more noticeable since her previous hearing test. She noted that conversations while volunteering, in crowds, and during meetings have become more challenging. She has a family history of hearing loss and history of noise exposure. She indicated that her left ear is subjectively her better hearing ear. She has constant, buzzing tinnitus in both ears. She indicated that it is non-bothersome. She denied otalgia, dizziness, and previous otologic surgery.    Previous audiologic evaluation on 4/6/2023 revealed a mild sloping to moderately-severe sensorineural hearing loss in the left ear and a mild sloping to severe sensorineural hearing loss in the right ear. Tympanograms were type A (normal) bilaterally. Word recognition abilities were measured to be excellent in both ears.     Evaluation:    Otoscopy  Mild cerumen bilaterally    Tympanometry  Right ear: Type A, normal ear canal volume and compliance.  Left ear: Type C, normal ear canal volume with negative peak pressure.     Acoustic Reflexes  Right ear: Could not maintain seal  Left ear: Did not test due to abnormal tympanogram    Audiometric Evaluation  Right ear: mild through 4000 Hz sloping to severe sensorineural hearing loss. Word recognition ability estimated to be excellent(100%) at 75 dB HL based on an NU-6 recorded ordered by difficulty 10-word list.  Left ear: essentially normal hearing through 500 Hz sloping to a mild to moderate sensorineural hearing loss. Word recognition ability estimated to be excellent(100%) at 65 dB HL based on an NU-6 recorded ordered by difficulty 10-word list.    Binaural QuickSIN testing was completed at 70 dB HL. Two  lists were averaged for best estimation of performance in noise. Results indicate an SNR loss of 1 dB. This degree of SNR loss is in the normal to near normal range. Performance today indicates that the patient may hear better than normals in noise with directional microphone technology.    When compared to previous test results of 4/6/2023, thresholds are stable.    The test results were discussed with the patient.     Impressions  Today's evaluation revealed a mild sloping to severe sensorineural hearing loss in the right ear and essentially normal hearing through 500 Hz sloping to a mild to moderate sensorineural hearing loss in the left ear. Word recognition abilities were measured to be excellent bilaterally. Tympanograms were type A (normal) in the right ear and type C (negative pressure) in the left ear.    The patient was informed that they are a candidate for binaural amplification. They were encouraged to contact their insurance provider to inquire about a possible hearing aid benefit and where it can be used. If they do not have a hearing aid benefit or wish to obtain hearing aids through our center, they were encouraged to schedule a hearing aid consult appointment at their earliest convenience.     The patient was given a copy of today's audiogram with these recommendations.     All patient questions were answered.     Recommendations  - Continue medical follow-up with established providers   - Re-test hearing annually  - Consider binaural amplification    Time: 5704-6823    AARON Parrish, CCC-A  Licensed Audiologist

## 2025-04-30 ENCOUNTER — CLINICAL SUPPORT (OUTPATIENT)
Dept: AUDIOLOGY | Facility: CLINIC | Age: 70
End: 2025-04-30
Payer: MEDICARE

## 2025-04-30 DIAGNOSIS — H93.13 TINNITUS OF BOTH EARS: ICD-10-CM

## 2025-04-30 DIAGNOSIS — H90.3 SENSORINEURAL HEARING LOSS (SNHL) OF BOTH EARS: Primary | ICD-10-CM

## 2025-04-30 DIAGNOSIS — R94.128 ABNORMAL TYMPANOGRAM: ICD-10-CM

## 2025-04-30 PROCEDURE — 92567 TYMPANOMETRY: CPT

## 2025-04-30 PROCEDURE — 92557 COMPREHENSIVE HEARING TEST: CPT

## 2025-04-30 ASSESSMENT — PAIN - FUNCTIONAL ASSESSMENT: PAIN_FUNCTIONAL_ASSESSMENT: 0-10

## 2025-04-30 ASSESSMENT — PAIN SCALES - GENERAL: PAINLEVEL_OUTOF10: 0 - NO PAIN

## 2025-04-30 NOTE — LETTER
April 30, 2025     Lydia Bliss DO  [address]  [address]  [facility] [address]    Patient: Codie Adhikari   YOB: 1955   Date of Visit: 4/30/2025       Dear Dr. Lydia Bliss DO:    Thank you for referring Codie Adhikari to me for evaluation. Below are my notes for this consultation.  If you have questions, please do not hesitate to call me. I look forward to following your patient along with you.       Sincerely,     AARON Parrish, CCC-A      CC: No Recipients  ______________________________________________________________________________________    AUDIOLOGIC EVALUATION  Name: Codie Adhikari  YOB: 1955  MRN: [id number]  Age: 70 y.o.    Date of Evaluation:  4/30/2025     History:  Codie Adhikari, 70 y.o., was seen today for a hearing evaluation on order from Lydia Bliss DO.The patient reported hearing loss in both ears that has become more noticeable since her previous hearing test. She noted that conversations while volunteering, in crowds, and during meetings have become more challenging. She has a family history of hearing loss and history of noise exposure. She indicated that her left ear is subjectively her better hearing ear. She has constant, buzzing tinnitus in both ears. She indicated that it is non-bothersome. She denied otalgia, dizziness, and previous otologic surgery.    Previous audiologic evaluation on 4/6/2023 revealed a mild sloping to moderately-severe sensorineural hearing loss in the left ear and a mild sloping to severe sensorineural hearing loss in the right ear. Tympanograms were type A (normal) bilaterally. Word recognition abilities were measured to be excellent in both ears.     Evaluation:    Otoscopy  Mild cerumen bilaterally    Tympanometry  Right ear: Type A, normal ear canal volume and compliance.  Left ear: Type C, normal ear canal volume with negative peak pressure.     Acoustic Reflexes  Right ear: Could not maintain  seal  Left ear: Did not test due to abnormal tympanogram    Audiometric Evaluation  Right ear: mild through 4000 Hz sloping to severe sensorineural hearing loss. Word recognition ability estimated to be excellent(100%) at 75 dB HL based on an NU-6 recorded ordered by difficulty 10-word list.  Left ear: essentially normal hearing through 500 Hz sloping to a mild to moderate sensorineural hearing loss. Word recognition ability estimated to be excellent(100%) at 65 dB HL based on an NU-6 recorded ordered by difficulty 10-word list.    Binaural QuickSIN testing was completed at 70 dB HL. Two lists were averaged for best estimation of performance in noise. Results indicate an SNR loss of 1 dB. This degree of SNR loss is in the normal to near normal range. Performance today indicates that the patient may hear better than normals in noise with directional microphone technology.    When compared to previous test results of 4/6/2023, thresholds are stable.    The test results were discussed with the patient.     Impressions  Today's evaluation revealed a mild sloping to severe sensorineural hearing loss in the right ear and essentially normal hearing through 500 Hz sloping to a mild to moderate sensorineural hearing loss in the left ear. Word recognition abilities were measured to be excellent bilaterally. Tympanograms were type A (normal) in the right ear and type C (negative pressure) in the left ear.    The patient was informed that they are a candidate for binaural amplification. They were encouraged to contact their insurance provider to inquire about a possible hearing aid benefit and where it can be used. If they do not have a hearing aid benefit or wish to obtain hearing aids through our center, they were encouraged to schedule a hearing aid consult appointment at their earliest convenience.     The patient was given a copy of today's audiogram with these recommendations.     All patient questions were answered.      Recommendations  - Continue medical follow-up with established providers   - Re-test hearing annually  - Consider binaural amplification    Time: 6242-9967    AARON Parrish, CCC-A  Licensed Audiologist

## 2025-05-02 ENCOUNTER — TELEPHONE (OUTPATIENT)
Dept: ORTHOPEDIC SURGERY | Facility: CLINIC | Age: 70
End: 2025-05-02

## 2025-05-02 ENCOUNTER — OFFICE VISIT (OUTPATIENT)
Dept: ORTHOPEDIC SURGERY | Facility: CLINIC | Age: 70
End: 2025-05-02
Payer: MEDICARE

## 2025-05-02 ENCOUNTER — HOSPITAL ENCOUNTER (OUTPATIENT)
Dept: RADIOLOGY | Facility: CLINIC | Age: 70
Discharge: HOME | End: 2025-05-02
Payer: MEDICARE

## 2025-05-02 DIAGNOSIS — M25.561 CHRONIC PAIN OF RIGHT KNEE: ICD-10-CM

## 2025-05-02 DIAGNOSIS — G89.29 CHRONIC PAIN OF RIGHT KNEE: ICD-10-CM

## 2025-05-02 DIAGNOSIS — M17.11 OSTEOARTHRITIS OF RIGHT KNEE, UNSPECIFIED OSTEOARTHRITIS TYPE: Primary | ICD-10-CM

## 2025-05-02 PROCEDURE — 99213 OFFICE O/P EST LOW 20 MIN: CPT | Performed by: ORTHOPAEDIC SURGERY

## 2025-05-02 PROCEDURE — 73564 X-RAY EXAM KNEE 4 OR MORE: CPT | Mod: RT

## 2025-05-02 RX ORDER — MELOXICAM 15 MG/1
15 TABLET ORAL DAILY
Qty: 30 TABLET | Refills: 2 | Status: SHIPPED | OUTPATIENT
Start: 2025-05-02 | End: 2025-07-31

## 2025-05-02 NOTE — PROGRESS NOTES
History: Codie is here for her right knee. She injured it after coming out of a truck. She also fell on her left knee in the past. She has not had surgery. She denies numbness, tingling, and burning.     Past medical history: Multiple  Medications: Multiple  Allergies: No known drug allergies    Please refer to the intake H&P regarding the patient's review of systems, family history and social history as was done today    HEENT: Normal  Lungs: Clear to auscultation  Heart: RRR  Abdomen: Soft, nontender  Skin: clear  Extremity: She has tenderness directly over the medial joint line.  1+ crepitus with range of motion.  The affected knee was examined and inspected and was tender to the touch along the medial and lateral aspect with catching, locking or mechanical symptoms.  The skin was intact without breakdown or open wound.  Old incisions if present were healed.  There was a mild Abdifatah exam seen with some evidence of instability& weaknessin the collateral ligaments with varus/valgus stress& laxity in the anterior or posterior planes.   There was a negative Lachman's test, pivot shift test and posterior drawer sign with no foot drop, numbness or tingling.  Sensation, reflexes and pulses in the foot and ankle are preserved.   There was an effusion.   Range of motion showed good straight leg raise with flexion to 120 degrees and extension to 0 degrees.   The patient had the ability to bear weight, but with discomfort.  The patient's gait was antalgic secondary to the discomfort.  Contralateral exam is normal for strength, motion, stability and neurovascular assessment.    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 We reviewed her right knee X-ray that showed bone-on-bone arthritis. We discussed different treatment options including  cortisone/gel injections, bracing, and medication. She was provided with a medial  brace and prescribed Mobic today.   We will see her back in 6 to 8 weeks to assess progress.  We can consider injections or other modalities if not improving.    The patient was prescribed a medial  brace for medial compartment DJD.  Physical examination positive for mild laxity with varus stress.  The patient is ambulatory with or without aid: But, has weakness, instability and/or deformity of their left knee which requires stabilization from this orthosis to improve their function.    All questions were answered today with the patient.    This note was generated with voice recognition software and may contain grammatical errors.    Scribe Attestation  By signing my name below, IGus, Alicia   attest that this documentation has been prepared under the direction and in the presence of Aman Fallon MD.

## 2025-05-02 NOTE — TELEPHONE ENCOUNTER
5/2/25 - Medial OA brace  Climaflex - all paperwork sent to Krista [facility] same day due to Medicare guidelines

## 2025-05-04 ENCOUNTER — OFFICE VISIT (OUTPATIENT)
Dept: URGENT CARE | Age: 70
End: 2025-05-04
Payer: MEDICARE

## 2025-05-04 VITALS
WEIGHT: 179 LBS | TEMPERATURE: 98.7 F | DIASTOLIC BLOOD PRESSURE: 91 MMHG | SYSTOLIC BLOOD PRESSURE: 117 MMHG | BODY MASS INDEX: 32.94 KG/M2 | HEART RATE: 70 BPM | HEIGHT: 62 IN | OXYGEN SATURATION: 95 % | RESPIRATION RATE: 16 BRPM

## 2025-05-04 DIAGNOSIS — Z48.02 VISIT FOR SUTURE REMOVAL: Primary | ICD-10-CM

## 2025-05-04 ASSESSMENT — PAIN SCALES - GENERAL: PAINLEVEL_OUTOF10: 0-NO PAIN

## 2025-05-04 NOTE — PROGRESS NOTES
"Subjective   Patient ID: Codie Adhikari is a 70 y.o. female. They present today with a chief complaint of Suture / Staple Removal (On left hand, two stitches placed 4/24).    Patient disposition: Home    History of Present Illness  HPI  Suture removal from 10 days ago on left hand, first webspace.  Tube replaced at urgent care after a screwdriver slipped and punctured her.  Denies any complaints.  No drainage.  No numbness or tingling.      Past Medical History  Allergies as of 05/04/2025 - Reviewed 05/04/2025   Allergen Reaction Noted    Latex Itching 10/21/2023    Pollen extracts Cough 04/29/2024    Tetracyclines Nausea Only and Nausea/vomiting 11/14/2019       Prescriptions Prior to Admission[1]     Current Medications[2]    Problem List[3]    Surgical History[4]     reports that she has never smoked. She has never been exposed to tobacco smoke. She has never used smokeless tobacco. She reports current alcohol use. She reports that she does not use drugs.    Review of Systems  As noted in HPI. ROS otherwise negative unless noted.       Objective    Vitals:    05/04/25 1159   BP: (!) 117/91   Pulse: 70   Resp: 16   Temp: 37.1 °C (98.7 °F)   TempSrc: Oral   SpO2: 95%   Weight: 81.2 kg (179 lb)   Height: 1.575 m (5' 2\")     No LMP recorded. Patient is postmenopausal.    Physical Exam  Skin: Left hand with 2 sutures in place at first webspace.  Scabbed over and well healed.  Area was cleansed and then 2 sutures were easily removed with no dehiscence.  Tolerated well.  Normal range of motion.  Neurovascular intact.        Procedures    Point of Care Test & Imaging Results from this visit  Results for orders placed or performed in visit on 03/25/25   CBC    Collection Time: 04/14/25 10:13 AM   Result Value Ref Range    WHITE BLOOD CELL COUNT 7.2 3.8 - 10.8 Thousand/uL    RED BLOOD CELL COUNT 4.77 3.80 - 5.10 Million/uL    HEMOGLOBIN 14.8 11.7 - 15.5 g/dL    HEMATOCRIT 43.2 35.0 - 45.0 %    MCV 90.6 80.0 - 100.0 fL "    MCH 31.0 27.0 - 33.0 pg    MCHC 34.3 32.0 - 36.0 g/dL    RDW 13.0 11.0 - 15.0 %    PLATELET COUNT 215 140 - 400 Thousand/uL    MPV 10.0 7.5 - 12.5 fL   Comprehensive Metabolic Panel    Collection Time: 04/14/25 10:13 AM   Result Value Ref Range    GLUCOSE 92 65 - 99 mg/dL    UREA NITROGEN (BUN) 15 7 - 25 mg/dL    CREATININE 0.90 0.60 - 1.00 mg/dL    EGFR 68 > OR = 60 mL/min/1.73m2    SODIUM 142 135 - 146 mmol/L    POTASSIUM 4.3 3.5 - 5.3 mmol/L    CHLORIDE 106 98 - 110 mmol/L    CARBON DIOXIDE 28 20 - 32 mmol/L    ELECTROLYTE BALANCE 8 7 - 17 mmol/L (calc)    CALCIUM 9.3 8.6 - 10.4 mg/dL    PROTEIN, TOTAL 6.6 6.1 - 8.1 g/dL    ALBUMIN 4.4 3.6 - 5.1 g/dL    BILIRUBIN, TOTAL 0.5 0.2 - 1.2 mg/dL    ALKALINE PHOSPHATASE 68 37 - 153 U/L    AST 17 10 - 35 U/L    ALT 19 6 - 29 U/L   Lipid Panel    Collection Time: 04/14/25 10:13 AM   Result Value Ref Range    CHOLESTEROL, TOTAL 141 <200 mg/dL    HDL CHOLESTEROL 38 (L) > OR = 50 mg/dL    TRIGLYCERIDES 192 (H) <150 mg/dL    LDL-CHOLESTEROL 74 mg/dL (calc)    CHOL/HDLC RATIO 3.7 <5.0 (calc)    NON HDL CHOLESTEROL 103 <130 mg/dL (calc)   TSH with reflex to Free T4 if abnormal    Collection Time: 04/14/25 10:13 AM   Result Value Ref Range    TSH W/REFLEX TO FT4 2.32 0.40 - 4.50 mIU/L   Vitamin D 25-Hydroxy,Total (for eval of Vitamin D levels)    Collection Time: 04/14/25 10:13 AM   Result Value Ref Range    VITAMIN D,25-OH,TOTAL,IA 69 30 - 100 ng/mL            Diagnostic study results (if any) were reviewed.  (If applicable) preliminary radiology reading: [none]    Assessment/Plan   Allergies, medications, history, and pertinent labs/EKGs/Imaging reviewed.        Medical Decision Making  See note    Orders and Diagnoses  There are no diagnoses linked to this encounter.    Medical Admin Record      Follow Up Instructions  No follow-ups on file.    [At time of discharge patient was clinically well-appearing and HDS for outpatient management. The patient and/or family was  educated regarding diagnosis, supportive care, OTC and Rx medications. The patient and/or family was given the opportunity to ask questions prior to discharge and all questions answered. They verbalized understanding of my discussion of the plans for treatment, expected course, indications to return to  or seek further evaluation in ED, and the need for timely follow up as directed. ]      Electronically signed by [facility]           [1] (Not in a hospital admission)  [2]   Current Outpatient Medications   Medication Sig Dispense Refill    albuterol 90 mcg/actuation inhaler Inhale 2 puffs every 4 hours if needed.      atorvastatin (Lipitor) 10 mg tablet Take 1 tablet (10 mg) by mouth once daily. 90 tablet 3    b complex vitamins capsule Take 1 capsule by mouth once daily.      buPROPion XL (Wellbutrin XL) 150 mg 24 hr tablet Take 1 tablet (150 mg) by mouth once daily. 90 tablet 3    calcium carbonate 600 mg calcium (1,500 mg) tablet Take 1 tablet (1,500 mg) by mouth once daily.      cetirizine (ZyrTEC) 10 mg tablet Take 1 tablet (10 mg) by mouth once daily. 30 tablet 1    cholecalciferol (Vitamin D-3) 50 MCG (2000 UT) tablet Take 1 tablet (50 mcg) by mouth once daily.      diclofenac sodium 3 % gel Apply sparingly to the affected areas twice daily      estradiol (Estrace) 0.01 % (0.1 mg/gram) vaginal cream Apply pea sized amount to vagina nightly for 1 week then every Monday and Thursday 42.5 g 5    hydroCHLOROthiazide (HYDRODiuril) 25 mg tablet Take 1 tablet (25 mg) by mouth once daily. 90 tablet 3    ipratropium (Atrovent) 42 mcg (0.06 %) nasal spray Administer 2 sprays into each nostril 4 times a day as needed for rhinitis for up to 4 days. 15 mL 0    lutein 40 mg capsule Take 1 capsule by mouth once daily.      meloxicam (Mobic) 15 mg tablet Take 1 tablet (15 mg) by mouth once daily. 30 tablet 2    multivitamin (Daily Multi-Vitamin) tablet Take 1 tablet by mouth once daily.      mupirocin  (Bactroban) 2 % ointment Apply topically 3 times a day for 10 days. 22 g 0    NON FORMULARY Bupivacaine HCI SOSY (Patient not taking: Reported on 4/24/2025)      PROTEIN SUPPLEMENT ORAL 80% POWD; Use as directed.       No current facility-administered medications for this visit.   [3]   Patient Active Problem List  Diagnosis    Asthma    Rosacea, unspecified    Dry eye syndrome    Epiretinal membrane (ERM) of right eye    Primary hypertension    History of right cataract extraction    Hyperlipidemia    Chronic pain of right knee    Nocturia    Vegetarian diet    Recurrent major depressive disorder, in full remission    Sensorineural hearing loss (SNHL) of both ears    LINDA on CPAP    Post-nasal drip   [4]   Past Surgical History:  Procedure Laterality Date    CATARACT EXTRACTION      OTHER SURGICAL HISTORY  08/17/2022    Cataract surgery    TONSILLECTOMY  04/27/2016    Tonsillectomy    TUBAL LIGATION  04/27/2016    Tubal Ligation

## 2025-05-04 NOTE — PATIENT INSTRUCTIONS
Both sutures were removed and have completely healed.  Keep the area clean for the next several days until the scab completely falls off.  Resume normal activities.

## 2025-05-06 DIAGNOSIS — I10 ESSENTIAL HYPERTENSION: ICD-10-CM

## 2025-05-07 NOTE — TELEPHONE ENCOUNTER
----- Message from Lydia Bliss sent at 5/6/2025  5:59 PM EDT -----  Please call patient with the following message, she has not yet viewed her result note, thank youTanner Mackay, your mammogram is normal.  This is great news.  We can repeat it in 1 year.  Thanks!

## 2025-05-08 RX ORDER — ATORVASTATIN CALCIUM 10 MG/1
10 TABLET, FILM COATED ORAL DAILY
Qty: 90 TABLET | Refills: 3 | Status: SHIPPED | OUTPATIENT
Start: 2025-05-08

## 2025-06-20 ENCOUNTER — APPOINTMENT (OUTPATIENT)
Dept: ORTHOPEDIC SURGERY | Facility: CLINIC | Age: 70
End: 2025-06-20
Payer: MEDICARE

## 2025-06-26 DIAGNOSIS — E53.8 B12 DEFICIENCY: Primary | ICD-10-CM

## 2025-06-26 DIAGNOSIS — R73.9 HYPERGLYCEMIA: ICD-10-CM

## 2025-06-28 LAB
EST. AVERAGE GLUCOSE BLD GHB EST-MCNC: 108 MG/DL
EST. AVERAGE GLUCOSE BLD GHB EST-SCNC: 6 MMOL/L
HBA1C MFR BLD: 5.4 %
VIT B12 SERPL-MCNC: 608 PG/ML (ref 200–1100)

## 2025-06-29 LAB — BACTERIA UR CULT: ABNORMAL

## 2025-07-01 DIAGNOSIS — N30.90 CYSTITIS: Primary | ICD-10-CM

## 2025-07-01 RX ORDER — NITROFURANTOIN 25; 75 MG/1; MG/1
100 CAPSULE ORAL 2 TIMES DAILY
Qty: 10 CAPSULE | Refills: 0 | Status: SHIPPED | OUTPATIENT
Start: 2025-07-01 | End: 2025-07-06

## 2025-07-13 DIAGNOSIS — M17.11 OSTEOARTHRITIS OF RIGHT KNEE, UNSPECIFIED OSTEOARTHRITIS TYPE: ICD-10-CM

## 2025-07-14 RX ORDER — MELOXICAM 15 MG/1
15 TABLET ORAL DAILY
Qty: 60 TABLET | Refills: 5 | Status: SHIPPED | OUTPATIENT
Start: 2025-07-14

## 2025-08-08 ENCOUNTER — APPOINTMENT (OUTPATIENT)
Dept: OPHTHALMOLOGY | Facility: CLINIC | Age: 70
End: 2025-08-08
Payer: MEDICARE

## 2025-08-08 DIAGNOSIS — Z96.1 PSEUDOPHAKIA OF BOTH EYES: ICD-10-CM

## 2025-08-08 DIAGNOSIS — H35.371 EPIRETINAL MEMBRANE (ERM) OF RIGHT EYE: Primary | ICD-10-CM

## 2025-08-08 PROCEDURE — 92134 CPTRZ OPH DX IMG PST SGM RTA: CPT | Mod: BILATERAL PROCEDURE

## 2025-08-08 PROCEDURE — 99213 OFFICE O/P EST LOW 20 MIN: CPT

## 2025-08-08 ASSESSMENT — ENCOUNTER SYMPTOMS
EYES NEGATIVE: 1
CONSTITUTIONAL NEGATIVE: 0
NEUROLOGICAL NEGATIVE: 0
PSYCHIATRIC NEGATIVE: 0
MUSCULOSKELETAL NEGATIVE: 0
HEMATOLOGIC/LYMPHATIC NEGATIVE: 0
GASTROINTESTINAL NEGATIVE: 0
CARDIOVASCULAR NEGATIVE: 0
ENDOCRINE NEGATIVE: 0
RESPIRATORY NEGATIVE: 0
ALLERGIC/IMMUNOLOGIC NEGATIVE: 0

## 2025-08-08 ASSESSMENT — VISUAL ACUITY
OS_CC: 20/20
OD_CC: 20/20
METHOD: SNELLEN - LINEAR

## 2025-08-08 ASSESSMENT — TONOMETRY
OS_IOP_MMHG: 12
IOP_METHOD: GOLDMANN APPLANATION
OD_IOP_MMHG: 12

## 2025-08-15 ASSESSMENT — SLIT LAMP EXAM - LIDS
COMMENTS: NORMAL
COMMENTS: NORMAL

## 2025-08-15 ASSESSMENT — CUP TO DISC RATIO
OD_RATIO: .3
OS_RATIO: .3

## 2025-08-15 ASSESSMENT — EXTERNAL EXAM - LEFT EYE: OS_EXAM: NORMAL

## 2025-08-15 ASSESSMENT — EXTERNAL EXAM - RIGHT EYE: OD_EXAM: NORMAL

## 2025-08-29 ENCOUNTER — APPOINTMENT (OUTPATIENT)
Facility: CLINIC | Age: 70
End: 2025-08-29
Payer: MEDICARE

## 2025-08-29 VITALS
SYSTOLIC BLOOD PRESSURE: 139 MMHG | DIASTOLIC BLOOD PRESSURE: 91 MMHG | OXYGEN SATURATION: 97 % | BODY MASS INDEX: 34.41 KG/M2 | HEIGHT: 62 IN | TEMPERATURE: 97.5 F | HEART RATE: 65 BPM | WEIGHT: 187 LBS

## 2025-08-29 DIAGNOSIS — G47.33 OSA (OBSTRUCTIVE SLEEP APNEA): Primary | ICD-10-CM

## 2025-08-29 DIAGNOSIS — E66.9 OBESITY (BMI 30-39.9): ICD-10-CM

## 2025-08-29 DIAGNOSIS — I10 ESSENTIAL HYPERTENSION: ICD-10-CM

## 2025-08-29 PROCEDURE — 1125F AMNT PAIN NOTED PAIN PRSNT: CPT | Performed by: GENERAL PRACTICE

## 2025-08-29 PROCEDURE — 3075F SYST BP GE 130 - 139MM HG: CPT | Performed by: GENERAL PRACTICE

## 2025-08-29 PROCEDURE — 3080F DIAST BP >= 90 MM HG: CPT | Performed by: GENERAL PRACTICE

## 2025-08-29 PROCEDURE — 1159F MED LIST DOCD IN RCRD: CPT | Performed by: GENERAL PRACTICE

## 2025-08-29 PROCEDURE — 99214 OFFICE O/P EST MOD 30 MIN: CPT | Performed by: GENERAL PRACTICE

## 2025-08-29 PROCEDURE — 1036F TOBACCO NON-USER: CPT | Performed by: GENERAL PRACTICE

## 2025-08-29 PROCEDURE — 3008F BODY MASS INDEX DOCD: CPT | Performed by: GENERAL PRACTICE

## 2025-08-29 PROCEDURE — G2211 COMPLEX E/M VISIT ADD ON: HCPCS | Performed by: GENERAL PRACTICE

## 2025-08-29 ASSESSMENT — SLEEP AND FATIGUE QUESTIONNAIRES
HOW LIKELY ARE YOU TO NOD OFF OR FALL ASLEEP IN A CAR, WHILE STOPPED FOR A FEW MINUTES IN TRAFFIC: WOULD NEVER DOZE
ESS-CHAD TOTAL SCORE: 7
HOW LIKELY ARE YOU TO NOD OFF OR FALL ASLEEP WHEN YOU ARE A PASSENGER IN A CAR FOR AN HOUR WITHOUT A BREAK: HIGH CHANCE OF DOZING
HOW LIKELY ARE YOU TO NOD OFF OR FALL ASLEEP WHILE SITTING QUIETLY AFTER LUNCH WITHOUT ALCOHOL: WOULD NEVER DOZE
HOW LIKELY ARE YOU TO NOD OFF OR FALL ASLEEP WHILE LYING DOWN TO REST IN THE AFTERNOON WHEN CIRCUMSTANCES PERMIT: MODERATE CHANCE OF DOZING
HOW LIKELY ARE YOU TO NOD OFF OR FALL ASLEEP WHILE SITTING AND READING: WOULD NEVER DOZE
HOW LIKELY ARE YOU TO NOD OFF OR FALL ASLEEP WHILE SITTING AND TALKING TO SOMEONE: WOULD NEVER DOZE
HOW LIKELY ARE YOU TO NOD OFF OR FALL ASLEEP WHILE WATCHING TV: MODERATE CHANCE OF DOZING
SITING INACTIVE IN A PUBLIC PLACE LIKE A CLASS ROOM OR A MOVIE THEATER: WOULD NEVER DOZE

## 2025-08-29 ASSESSMENT — PATIENT HEALTH QUESTIONNAIRE - PHQ9
SUM OF ALL RESPONSES TO PHQ9 QUESTIONS 1 AND 2: 0
2. FEELING DOWN, DEPRESSED OR HOPELESS: NOT AT ALL
1. LITTLE INTEREST OR PLEASURE IN DOING THINGS: NOT AT ALL

## 2025-08-29 ASSESSMENT — PAIN SCALES - GENERAL: PAINLEVEL_OUTOF10: 2

## 2025-12-11 ENCOUNTER — APPOINTMENT (OUTPATIENT)
Facility: CLINIC | Age: 70
End: 2025-12-11
Payer: MEDICARE

## 2026-01-28 ENCOUNTER — APPOINTMENT (OUTPATIENT)
Dept: DERMATOLOGY | Facility: CLINIC | Age: 71
End: 2026-01-28
Payer: MEDICARE

## 2026-03-27 ENCOUNTER — APPOINTMENT (OUTPATIENT)
Dept: PRIMARY CARE | Facility: CLINIC | Age: 71
End: 2026-03-27
Payer: MEDICARE

## 2026-04-20 ENCOUNTER — APPOINTMENT (OUTPATIENT)
Dept: OPHTHALMOLOGY | Facility: CLINIC | Age: 71
End: 2026-04-20
Payer: MEDICARE

## 2026-08-14 ENCOUNTER — APPOINTMENT (OUTPATIENT)
Dept: OPHTHALMOLOGY | Facility: CLINIC | Age: 71
End: 2026-08-14
Payer: MEDICARE